# Patient Record
Sex: FEMALE | Race: OTHER | HISPANIC OR LATINO | Employment: OTHER | URBAN - METROPOLITAN AREA
[De-identification: names, ages, dates, MRNs, and addresses within clinical notes are randomized per-mention and may not be internally consistent; named-entity substitution may affect disease eponyms.]

---

## 2017-01-28 ENCOUNTER — GENERIC CONVERSION - ENCOUNTER (OUTPATIENT)
Dept: OTHER | Facility: OTHER | Age: 67
End: 2017-01-28

## 2017-02-16 ENCOUNTER — ALLSCRIPTS OFFICE VISIT (OUTPATIENT)
Dept: OTHER | Facility: OTHER | Age: 67
End: 2017-02-16

## 2017-04-16 DIAGNOSIS — R73.09 OTHER ABNORMAL GLUCOSE: ICD-10-CM

## 2017-04-16 DIAGNOSIS — E03.9 HYPOTHYROIDISM: ICD-10-CM

## 2017-06-06 ENCOUNTER — APPOINTMENT (OUTPATIENT)
Dept: LAB | Facility: HOSPITAL | Age: 67
End: 2017-06-06
Attending: FAMILY MEDICINE
Payer: MEDICARE

## 2017-06-06 ENCOUNTER — TRANSCRIBE ORDERS (OUTPATIENT)
Dept: LAB | Facility: HOSPITAL | Age: 67
End: 2017-06-06

## 2017-06-06 DIAGNOSIS — E78.49 FAMILIAL COMBINED HYPERLIPIDEMIA: Primary | ICD-10-CM

## 2017-06-06 DIAGNOSIS — E78.49 FAMILIAL COMBINED HYPERLIPIDEMIA: ICD-10-CM

## 2017-06-06 LAB
ALBUMIN SERPL BCP-MCNC: 3.5 G/DL (ref 3.5–5)
ALP SERPL-CCNC: 73 U/L (ref 46–116)
ALT SERPL W P-5'-P-CCNC: 31 U/L (ref 12–78)
ANION GAP SERPL CALCULATED.3IONS-SCNC: 6 MMOL/L (ref 4–13)
AST SERPL W P-5'-P-CCNC: 24 U/L (ref 5–45)
BILIRUB SERPL-MCNC: 0.36 MG/DL (ref 0.2–1)
BUN SERPL-MCNC: 17 MG/DL (ref 5–25)
CALCIUM SERPL-MCNC: 9 MG/DL (ref 8.3–10.1)
CHLORIDE SERPL-SCNC: 104 MMOL/L (ref 100–108)
CHOLEST SERPL-MCNC: 159 MG/DL (ref 50–200)
CO2 SERPL-SCNC: 31 MMOL/L (ref 21–32)
CREAT SERPL-MCNC: 0.7 MG/DL (ref 0.6–1.3)
ERYTHROCYTE [DISTWIDTH] IN BLOOD BY AUTOMATED COUNT: 13.9 % (ref 11.6–15.1)
GFR SERPL CREATININE-BSD FRML MDRD: >60 ML/MIN/1.73SQ M
GLUCOSE P FAST SERPL-MCNC: 102 MG/DL (ref 65–99)
HCT VFR BLD AUTO: 38.7 % (ref 34.8–46.1)
HDLC SERPL-MCNC: 59 MG/DL (ref 40–60)
HGB BLD-MCNC: 12.4 G/DL (ref 11.5–15.4)
LDLC SERPL CALC-MCNC: 84 MG/DL (ref 0–100)
MCH RBC QN AUTO: 29 PG (ref 26.8–34.3)
MCHC RBC AUTO-ENTMCNC: 32 G/DL (ref 31.4–37.4)
MCV RBC AUTO: 90 FL (ref 82–98)
PLATELET # BLD AUTO: 226 THOUSANDS/UL (ref 149–390)
PMV BLD AUTO: 10.8 FL (ref 8.9–12.7)
POTASSIUM SERPL-SCNC: 4.1 MMOL/L (ref 3.5–5.3)
PROT SERPL-MCNC: 7.7 G/DL (ref 6.4–8.2)
RBC # BLD AUTO: 4.28 MILLION/UL (ref 3.81–5.12)
SODIUM SERPL-SCNC: 141 MMOL/L (ref 136–145)
TRIGL SERPL-MCNC: 82 MG/DL
TSH SERPL DL<=0.05 MIU/L-ACNC: 1.39 UIU/ML (ref 0.36–3.74)
WBC # BLD AUTO: 5.56 THOUSAND/UL (ref 4.31–10.16)

## 2017-06-06 PROCEDURE — 36415 COLL VENOUS BLD VENIPUNCTURE: CPT

## 2017-06-06 PROCEDURE — 84443 ASSAY THYROID STIM HORMONE: CPT

## 2017-06-06 PROCEDURE — 80061 LIPID PANEL: CPT

## 2017-06-06 PROCEDURE — 85027 COMPLETE CBC AUTOMATED: CPT

## 2017-06-06 PROCEDURE — 80053 COMPREHEN METABOLIC PANEL: CPT

## 2017-07-18 ENCOUNTER — TRANSCRIBE ORDERS (OUTPATIENT)
Dept: SLEEP CENTER | Facility: CLINIC | Age: 67
End: 2017-07-18

## 2017-07-18 ENCOUNTER — HOSPITAL ENCOUNTER (OUTPATIENT)
Dept: SLEEP CENTER | Facility: CLINIC | Age: 67
Discharge: HOME | End: 2017-07-18
Payer: MEDICARE

## 2017-07-18 DIAGNOSIS — J44.9 OSA AND COPD OVERLAP SYNDROME (HCC): Primary | ICD-10-CM

## 2017-07-18 DIAGNOSIS — G47.33 OSA (OBSTRUCTIVE SLEEP APNEA): ICD-10-CM

## 2017-07-18 DIAGNOSIS — G47.33 OSA AND COPD OVERLAP SYNDROME (HCC): Primary | ICD-10-CM

## 2017-08-21 ENCOUNTER — ALLSCRIPTS OFFICE VISIT (OUTPATIENT)
Dept: OTHER | Facility: OTHER | Age: 67
End: 2017-08-21

## 2017-08-21 DIAGNOSIS — E66.9 OBESITY: ICD-10-CM

## 2017-08-21 DIAGNOSIS — R73.09 OTHER ABNORMAL GLUCOSE: ICD-10-CM

## 2017-08-22 ENCOUNTER — APPOINTMENT (OUTPATIENT)
Dept: LAB | Facility: HOSPITAL | Age: 67
End: 2017-08-22
Attending: INTERNAL MEDICINE
Payer: MEDICARE

## 2017-08-22 ENCOUNTER — TRANSCRIBE ORDERS (OUTPATIENT)
Dept: LAB | Facility: HOSPITAL | Age: 67
End: 2017-08-22

## 2017-08-22 DIAGNOSIS — E66.9 OBESITY: ICD-10-CM

## 2017-08-22 DIAGNOSIS — R73.09 OTHER ABNORMAL GLUCOSE: ICD-10-CM

## 2017-08-22 LAB
CORTIS AM PEAK SERPL-MCNC: 1 UG/DL (ref 4.2–22.4)
EST. AVERAGE GLUCOSE BLD GHB EST-MCNC: 126 MG/DL
HBA1C MFR BLD: 6 % (ref 4.2–6.3)

## 2017-08-22 PROCEDURE — 36415 COLL VENOUS BLD VENIPUNCTURE: CPT

## 2017-08-22 PROCEDURE — 83036 HEMOGLOBIN GLYCOSYLATED A1C: CPT

## 2017-08-22 PROCEDURE — 82533 TOTAL CORTISOL: CPT

## 2017-08-23 ENCOUNTER — GENERIC CONVERSION - ENCOUNTER (OUTPATIENT)
Dept: OTHER | Facility: OTHER | Age: 67
End: 2017-08-23

## 2017-10-17 ENCOUNTER — TRANSCRIBE ORDERS (OUTPATIENT)
Dept: LAB | Facility: HOSPITAL | Age: 67
End: 2017-10-17

## 2017-10-17 ENCOUNTER — APPOINTMENT (OUTPATIENT)
Dept: LAB | Facility: HOSPITAL | Age: 67
End: 2017-10-17
Attending: FAMILY MEDICINE
Payer: MEDICARE

## 2017-10-17 DIAGNOSIS — E78.49 FAMILIAL COMBINED HYPERLIPIDEMIA: Primary | ICD-10-CM

## 2017-10-17 DIAGNOSIS — E78.49 FAMILIAL COMBINED HYPERLIPIDEMIA: ICD-10-CM

## 2017-10-17 LAB
ALBUMIN SERPL BCP-MCNC: 4 G/DL (ref 3.5–5)
ALP SERPL-CCNC: 67 U/L (ref 46–116)
ALT SERPL W P-5'-P-CCNC: 36 U/L (ref 12–78)
ANION GAP SERPL CALCULATED.3IONS-SCNC: 5 MMOL/L (ref 4–13)
AST SERPL W P-5'-P-CCNC: 29 U/L (ref 5–45)
BASOPHILS # BLD AUTO: 0.01 THOUSANDS/ΜL (ref 0–0.1)
BASOPHILS NFR BLD AUTO: 0 % (ref 0–1)
BILIRUB SERPL-MCNC: 0.51 MG/DL (ref 0.2–1)
BUN SERPL-MCNC: 15 MG/DL (ref 5–25)
CALCIUM SERPL-MCNC: 9.1 MG/DL (ref 8.3–10.1)
CHLORIDE SERPL-SCNC: 105 MMOL/L (ref 100–108)
CHOLEST SERPL-MCNC: 152 MG/DL (ref 50–200)
CO2 SERPL-SCNC: 32 MMOL/L (ref 21–32)
CREAT SERPL-MCNC: 0.6 MG/DL (ref 0.6–1.3)
EOSINOPHIL # BLD AUTO: 0.03 THOUSAND/ΜL (ref 0–0.61)
EOSINOPHIL NFR BLD AUTO: 1 % (ref 0–6)
ERYTHROCYTE [DISTWIDTH] IN BLOOD BY AUTOMATED COUNT: 13.6 % (ref 11.6–15.1)
GFR SERPL CREATININE-BSD FRML MDRD: 96 ML/MIN/1.73SQ M
GLUCOSE P FAST SERPL-MCNC: 97 MG/DL (ref 65–99)
HCT VFR BLD AUTO: 39.8 % (ref 34.8–46.1)
HDLC SERPL-MCNC: 52 MG/DL (ref 40–60)
HGB BLD-MCNC: 12.8 G/DL (ref 11.5–15.4)
LDLC SERPL CALC-MCNC: 79 MG/DL (ref 0–100)
LYMPHOCYTES # BLD AUTO: 1.65 THOUSANDS/ΜL (ref 0.6–4.47)
LYMPHOCYTES NFR BLD AUTO: 29 % (ref 14–44)
MCH RBC QN AUTO: 28.9 PG (ref 26.8–34.3)
MCHC RBC AUTO-ENTMCNC: 32.2 G/DL (ref 31.4–37.4)
MCV RBC AUTO: 90 FL (ref 82–98)
MONOCYTES # BLD AUTO: 0.83 THOUSAND/ΜL (ref 0.17–1.22)
MONOCYTES NFR BLD AUTO: 15 % (ref 4–12)
NEUTROPHILS # BLD AUTO: 3.22 THOUSANDS/ΜL (ref 1.85–7.62)
NEUTS SEG NFR BLD AUTO: 55 % (ref 43–75)
NRBC BLD AUTO-RTO: 0 /100 WBCS
PLATELET # BLD AUTO: 221 THOUSANDS/UL (ref 149–390)
PMV BLD AUTO: 10.7 FL (ref 8.9–12.7)
POTASSIUM SERPL-SCNC: 3.8 MMOL/L (ref 3.5–5.3)
PROT SERPL-MCNC: 8 G/DL (ref 6.4–8.2)
RBC # BLD AUTO: 4.43 MILLION/UL (ref 3.81–5.12)
SODIUM SERPL-SCNC: 142 MMOL/L (ref 136–145)
TRIGL SERPL-MCNC: 104 MG/DL
TSH SERPL DL<=0.05 MIU/L-ACNC: 1.27 UIU/ML (ref 0.36–3.74)
WBC # BLD AUTO: 5.74 THOUSAND/UL (ref 4.31–10.16)

## 2017-10-17 PROCEDURE — 80053 COMPREHEN METABOLIC PANEL: CPT

## 2017-10-17 PROCEDURE — 80061 LIPID PANEL: CPT

## 2017-10-17 PROCEDURE — 84443 ASSAY THYROID STIM HORMONE: CPT

## 2017-10-17 PROCEDURE — 85025 COMPLETE CBC W/AUTO DIFF WBC: CPT

## 2017-10-17 PROCEDURE — 36415 COLL VENOUS BLD VENIPUNCTURE: CPT

## 2018-01-11 NOTE — RESULT NOTES
Message   Normal     Verified Results  (1) T4, FREE [id number] 03:58PM Jessica, Bankim     Test Name Result Flag Reference   T4,FREE 1 22 ng/dL  0 76-1 46     (1) TSH [id number] 03:58PM Jessica, Bankim     Test Name Result Flag Reference   TSH 0 716 uIU/mL  0 358-3 740   Patients undergoing fluorescein dye angiography may retain small amounts of fluorescein in the body for 48-72 hours post procedure  Samples containing fluorescein can produce falsely depressed TSH values  If the patient had this procedure,a specimen should be resubmitted post fluorescein clearance          The recommended reference ranges for TSH during pregnancy are as follows:  First trimester 0 1 to 2 5 uIU/mL  Second trimester  0 2 to 3 0 uIU/mL  Third trimester 0 3 to 3 0 uIU/m

## 2018-01-12 NOTE — PROGRESS NOTES
Assessment    1  Hypothyroidism (244 9) (E03 9)   2  Hyperlipidemia (272 4) (E78 5)   3  Hypertension (401 9) (I10)   4  Obesity (278 00) (E66 9)   5  Vitamin D deficiency (268 9) (E55 9)   6  Abnormal glucose (790 29) (R73 09)    Plan  Abnormal glucose    · (1) HEMOGLOBIN A1C; Status:Active; Requested for:99Fah5260;    Perform:[facility]; MACI:57WAO1178; Ordered; For:Abnormal glucose; Ordered By:Radha Lopez;  Obesity    · Dexamethasone 1 MG Oral Tablet; Take one tablet at 11 pm   Rx By: Louann Lui; Dispense: 1 Days ; #:1 Tablet; Refill: 0; For: Obesity; SUPRIYA = N; Verified Transmission to [facility] #[id number] Last Updated By: System, [employer]Script[employer]; 8/21/2017 10:55:29 AM   · (1) CORTISOL AM SPECIMEN; Status:Active; Requested for:52Tfq9797;    Perform:[facility]; OBA:[id number]; Ordered;  For:Obesity; Ordered By:Radha Lopez;   · Follow-up visit in 6 months Evaluation and Treatment  Follow-up  Status: Hold For -  Scheduling  Requested for: 86Ifc0666   Ordered; For: Obesity; Ordered By: Louann Lui Performed:  Due: 60LTV9395   · Follow-Up With Advanced Practitioner Evaluation and Treatment  Follow-up  Status: Hold  For - Scheduling  Requested for: 38Ohx3282   Ordered; For: Obesity; Ordered By: Louann Lui Performed:  Due: 22Kow4572  Unlinked    · Pristiq 50 MG Oral Tablet Extended Release 24 Hour (Desvenlafaxine Succinate  ER)   Dispense: 30 Days ; #:30 TB24; Refill: 0; SUPRIYA = N; Record; Last Updated By: Tiburcio Livingston; 8/21/2017 10:34:42 AM    Discussion/Summary  Discussion Summary:   1  Hypothyroidism-continue current thyroid hormone replacement  2  Abnormal glucose tolerance-check hemoglobin A1c     3  Abnormal weight gain over time-is most likely due to diet  Rule out Cushing's disease by doing a 1 mg dexamethasone suppression test     4  Hypertension-this is close to goal     5  Hyperlipidemia-continue statin     Counseling Documentation With Imm: The patient was counseled regarding diagnostic results, instructions for management, patient and family education, impressions  Medication SE Review and Pt Understands Tx: The treatment plan was reviewed with the patient/guardian  The patient/guardian understands and agrees with the treatment plan      Chief Complaint  Chief Complaint Free Text Note Form: Follow up      History of Present Illness  Obesity (Follow-Up): The patient is being seen for follow-up of obesity  The patient reports doing poorly and a weight gain of 55 in 2 years pounds  She has had no significant interval events  The patient is currently asymptomatic  Associated symptoms: no poor self esteem and no constipation  Hypothyroidism (Follow-Up): The patient is being seen for follow-up of hypothyroidism of undetermined etiology  The patient reports doing well  She has had no significant interval events  The patient is currently asymptomatic  Associated symptoms: no myalgias, no arthralgias and no paresthesias  Medications include levothyroxine  Medications:  the patient is adherent to her medication regimen, but she denies medication side effects  Hyperlipidemia (Follow-Up): The patient states her hyperlipidemia has been under good control since the last visit  Comorbid Illnesses: diabetes mellitus and hypertension  She has no significant interval events  Symptoms: The patient is currently asymptomatic  Hypertension (Follow-Up): The patient presents for follow-up of essential hypertension  The patient states she has been doing well with her blood pressure control since the last visit  She has no comorbid illnesses  She has no significant interval events  Symptoms: The patient is currently asymptomatic  Associated symptoms include no headache  Review of Systems  ROS Reviewed:   ROS reviewed         Endo Adult ROS Female Established v2 Update - St ke:   Constitutional/General: recent weight gain, no recent weight loss, poor energy/fatigue, no increased energy level, no insomnia/sleep problems, no fever and no feeling weak  Breasts: no nipple discharge  Heart: high blood pressure, but no chest pain/tightness, no rapid/racing heart rate and no palpitations  Genitourinary - Urinary: no frequent urination, no excess urination and no urinating during the night  Eyes: no blurred vision, no double vision, no bulging eyes, no gritty/scratchy eyes and no excessive tearing  Mouth / Throat: difficulty swallowing, but no hoarseness  Neck: no lumps, no swollen glands, no neck pain, neck stiffness and no enlarged thyroid  Respiratory: no wheezing, no asthma and no persistent cough  Musculoskeletal: muscle aches/pain, joint aches/pain and muscle weakness  Skin & Hair: dry skin, no acne, the hair texture was not oily, no hair loss and no excessive hair growth  Gastrointestinal: no constipation, no diarrhea, no waking at night to drink and no stomach ache  Neurological: no blackouts, no weakness and no tremors  Reproductive:  frequency of period is not applicable  duration of period is not applicable  Date of last menstruation is not applicable  regular periods are not applicable  discomfort with periods is not applicable  excessive bleeding during period is not applicable  mood swings are not applicable  Endocrine: feeling hot frequently, no feeling cold frequently, no shifts between feeling hot and cold, cold hands or feet, no excessive sweating, thyroid problems, no blood sugar problems, no excessive thirst, excessive hunger, no change in shoe size, no nausea or vomiting and shaky hands  Active Problems    1  Abnormal glucose (790 29) (R73 09)   2  Back pain (724 5) (M54 9)   3  Benign colon polyp (211 3) (K63 5)   4  Blood loss anemia (280 0) (D50 0)   5  Carpal tunnel syndrome, unspecified laterality (354 0) (G56 00)   6  Cervical disc disorder with myelopathy (722 71) (M50 10)   7  Cervical radiculopathy (723 4) (M54 12)   8   Chronic myofascial pain (033 8,116 41) (M79 1,G89 29)   9  Gait disturbance (781 2) (R26 9)   10  Hyperlipidemia (272 4) (E78 5)   11  Hypertension (401 9) (I10)   12  Hypothyroidism (244 9) (E03 9)   13  Lumbar radiculopathy (724 4) (M54 16)   14  Lumbar spine pain (724 2) (M54 5)   15  Lumbar stenosis (724 02) (M48 06)   16  Neck pain (723 1) (M54 2)   17  Obesity (278 00) (E66 9)   18  Reactive hypoglycemia (251 2) (E16 1)   19  S/P lumbar fusion (V45 4) (Z98 1)   20  Sleep apnea (780 57) (G47 30)   21  Spondylolisthesis of lumbar region (738 4) (M43 16)   22  Status post cervical arthrodesis (V45 4) (Z98 1)   23  Vitamin D deficiency (268 9) (E55 9)    Past Medical History    1  History of Benign essential hypertension (401 1) (I10)   2  History of hypothyroidism (V12 29) (Z86 39)  Active Problems And Past Medical History Reviewed: The active problems and past medical history were reviewed and updated today  Surgical History    1  History of Knee Arthroplasty   2  History of Laminectomy Lumbar  Surgical History Reviewed: The surgical history was reviewed and updated today  Family History  Mother    1  Family history of Diabetes Mellitus (V18 0)  Sister    2  Family history of Cancer   3  Family history of Colon Cancer (V16 0)   4  Family history of Heart Disease (V17 49)   5  Family history of Inflammatory Polyps Of Colon   6  Family history of Thyroid disease  Brother    7  Family history of Cancer   8  Family history of Colon Cancer (V16 0)   9  Family history of Heart Disease (V17 49)   10  Family history of Thyroid disease  Family History    11  Family history of Colon Cancer (V16 0)   12  Family history of Generalized Osteoarthritis   13  Family history of Thyroid Disorder (V18 19)  Family History Reviewed: The family history was reviewed and updated today  Social History    · Denied: History of Drug Use   · Former smoker (V15 82) (J47 055)   · Never Drank Alcohol  Social History Reviewed:  The social history was reviewed and updated today  Current Meds   1  Atorvastatin Calcium 10 MG Oral Tablet; TAKE 1 TABLET AT BEDTIME; Therapy: 82Rpv3275 to (Evaluate:26Jan2018)  Requested for: 98FBY1121; Last   Rx:31Jan2017 Ordered   2  [facility] Vitamin B-12 1000 MCG Oral Tablet; TAKE 1 TABLET DAILY AS DIRECTED; Therapy: 02ZLQ5991 to (Evaluate:07Feb2016)  Requested for: 63KOW3926; Last   Rx:09Nov2015 Ordered   3  [facility] Vitamin B-6 100 MG TABS; TAKE 2 TABLETS (200MG) ONCE A DAY; Therapy: 17IMH8535 to (Evaluate:07Feb2016)  Requested for: 86AZK2045; Last   Rx:09Nov2015 Ordered   4  Furosemide 40 MG Oral Tablet; Therapy: 30Apr2011 to (Last Rx:30Apr2011)  Requested for: 30Apr2011 Ordered   5  Lyrica 75 MG Oral Capsule; TAKE 1 CAPSULE 3 TIMES DAILY; Therapy: (Recorded:20Nov2015) to Recorded   6  Multi-Vitamin Daily TABS; Therapy: (Recorded:20Nov2013) to Recorded   7  Pristiq 100 MG Oral Tablet Extended Release 24 Hour Recorded   8  Pristiq 50 MG Oral Tablet Extended Release 24 Hour; Therapy: 74IPJ0837 to (Evaluate:13May2015) Recorded   9  Synthroid 100 MCG Oral Tablet; take 1 tablet every day; Therapy: 37WSA8425 to ([facility])  Requested for: 87NUQ9736; Last   Rx:27Feb2017 Ordered   10  Vitamin D3 2000 UNIT Oral Capsule; Therapy: (Recorded:20Nov2013) to Recorded  Medication List Reviewed: The medication list was reviewed and updated today  Allergies    1  No Known Drug Allergies    Vitals  Vital Signs    Recorded: 21Aug2017 10:32AM   Heart Rate 64   Systolic 729   Diastolic 86   Height 4 ft 10 in   Weight 225 lb    BMI Calculated 47 02   BSA Calculated 1 91     Physical Exam    Constitutional   General appearance: No acute distress, well appearing and well nourished  Eyes   Conjunctiva and lids: No swelling, erythema, or discharge  Pupils: Equal, round and reactive to light  The sclera are anicteric  Extraocular movements are intact      Ears, Nose, Mouth, and Throat   External inspection of ears, nose and lips: Normal     Oropharynx: Normal with no erythema, edema, exudate or lesions  Exam of Head: The head is atraumatic and normocephalic  Neck: The neck is supple  The thyroid is normal in size with no palpable nodules  Pulmonary   Auscultation of lungs: Clear to auscultation bilaterally with normal chest expansion  Cardiovascular   Auscultation of heart: Normal rate and rhythm with no murmurs, gallops or rubs  Abdomen   Abdomen: Abdomen is soft, non-tender with normal bowel sounds  Lymphatic   Palpation of lymph nodes: No supraclavicular or suboccipital lymphadenopathy  Musculoskeletal   Inspection/palpation of joints, bones, and muscles: Muscle bulk and tone is normal     Skin   Skin and subcutaneous tissue: Normal skin temperature and color  Neurologic   Reflexes: 2+ and symmetric  Motor Strength: Strength is 5/5 bilaterally  Psychiatric   Orientation to person, place and time: Normal     Mood and affect: Affect and attention span are normal        Health Management  Benign colon polyp   COLONOSCOPY; every 3 years; Last 65TAH3853; Next Due: 62FXN6985;  Overdue    Signatures   Electronically signed by : JONATHAN Teixeira ; Aug 21 2017 11:01AM EST                       (Author)

## 2018-01-12 NOTE — RESULT NOTES
Discussion/Summary   Diabetes is under good control  There is no evidence of Cushing's  Verified Results  (1) HEMOGLOBIN A1C 22Aug2017 07:36AM Yolie Abed Order Number: [id number]     Test Name Result Flag Reference   HEMOGLOBIN A1C 6 0 %  4 2-6 3   EST  AVG  GLUCOSE 126 mg/dl       (1) CORTISOL AM SPECIMEN 51Zct9420 07:36AM Yolie Abed Order Number: [id number]     Test Name Result Flag Reference   CORTISO AM SPEC 1 0 ug/dL LL 4 2-22 4   Reference ranges established for specimens drawn between 7 and 9 am  Results may be inaccurate if timing is not correct

## 2018-01-13 VITALS
HEART RATE: 64 BPM | DIASTOLIC BLOOD PRESSURE: 86 MMHG | BODY MASS INDEX: 47.23 KG/M2 | HEIGHT: 58 IN | SYSTOLIC BLOOD PRESSURE: 136 MMHG | WEIGHT: 225 LBS

## 2018-01-13 NOTE — RESULT NOTES
Message   All of the labs are normal      Verified Results  (1) HEMOGLOBIN A1C 54ETJ7393 08:18AM [place]     Test Name Result Flag Reference   HEMOGLOBIN A1C 5 7 %  4 2-6 3   EST  AVG  GLUCOSE 117 mg/dl       (1) T4, FREE 79FZH5594 08:18AM [place]     Test Name Result Flag Reference   T4,FREE 1 03 ng/dL  0 76-1 46     (1) COMPREHENSIVE METABOLIC PANEL 62RVL4259 48:05AI [place]     Test Name Result Flag Reference   GLUCOSE,RANDM 94 mg/dL     If the patient is fasting, the ADA then defines impaired fasting glucose as > 100 mg/dL and diabetes as > or equal to 123 mg/dL  SODIUM 143 mmol/L  136-145   POTASSIUM 4 3 mmol/L  3 5-5 3   CHLORIDE 104 mmol/L  100-108   CARBON DIOXIDE 33 mmol/L H 21-32   ANION GAP (CALC) 6 mmol/L  4-13   BLOOD UREA NITROGEN 20 mg/dL  5-25   CREATININE 0 66 mg/dL  0 60-1 30   Standardized to IDMS reference method   CALCIUM 9 3 mg/dL  8 3-10 1   BILI, TOTAL 0 33 mg/dL  0 20-1 00   ALK PHOSPHATAS 87 U/L     ALT (SGPT) 27 U/L  12-78   AST(SGOT) 20 U/L  5-45   ALBUMIN 3 7 g/dL  3 5-5 0   TOTAL PROTEIN 7 8 g/dL  6 4-8 2   eGFR Non-African American      >60 0 ml/min/1 73sq [facility] [employer] Disease Education Program recommendations are as follows:  GFR calculation is accurate only with a steady state creatinine  Chronic Kidney disease less than 60 ml/min/1 73 sq  meters  Kidney failure less than 15 ml/min/1 73 sq  meters  (1) LIPID PANEL, FASTING 03EGX0930 08:18AM [place]     Test Name Result Flag Reference   CHOLESTEROL 169 mg/dL     HDL,DIRECT 67 mg/dL H 40-60   Specimen collection should occur prior to Metamizole administration due to the potential for falsely depressed results     LDL CHOLESTEROL CALCULATED 87 mg/dL  0-100   Triglyceride:         Normal              <150 mg/dl       Borderline High    150-199 mg/dl       High               200-499 mg/dl       Very High          >499 mg/dl  Cholesterol:         Desirable <200 mg/dl      Borderline High  200-239 mg/dl      High             >239 mg/dl  HDL Cholesterol:        High    >59 mg/dL      Low     <41 mg/dL  LDL CALCULATED:    This screening LDL is a calculated result  It does not have the accuracy of the Direct Measured LDL in the monitoring of patients with hyperlipidemia and/or statin therapy  Direct Measure LDL (KGU833) must be ordered separately in these patients  TRIGLYCERIDES 77 mg/dL  <=150   Specimen collection should occur prior to N-Acetylcysteine or Metamizole administration due to the potential for falsely depressed results  (1) [facility] 06AVR5948 [facility]      Test Name Result Flag Reference   TSH 0 931 uIU/mL  0 358-3 740   Patients undergoing fluorescein dye angiography may retain small amounts of fluorescein in the body for 48-72 hours post procedure  Samples containing fluorescein can produce falsely depressed TSH values  If the patient had this procedure,a specimen should be resubmitted post fluorescein clearance            The recommended reference ranges for TSH during pregnancy are as follows:  First trimester 0 1 to 2 5 uIU/mL  Second trimester  0 2 to 3 0 uIU/mL  Third trimester 0 3 to 3 0 uIU/m

## 2018-01-14 VITALS
WEIGHT: 218.01 LBS | HEIGHT: 58 IN | HEART RATE: 78 BPM | BODY MASS INDEX: 45.76 KG/M2 | SYSTOLIC BLOOD PRESSURE: 138 MMHG | DIASTOLIC BLOOD PRESSURE: 88 MMHG

## 2018-01-18 NOTE — MISCELLANEOUS
Message  GI Reminder Recall [facility]:   Date: 01/28/2017   Dear Jonas Banegas:     Review of our records shows you are due for the following: Colonoscopy  Please call the following office to schedule your appointment:   [address] [facility], [facility] [address], [place], [address] [place] [address]  [phone]  We look forward to hearing from you!      Sincerely,     Dr Sarah Lance MD      Signatures   Electronically signed by : Barbra Day, ; Jan 28 2017  8:57AM EST                       (Author)

## 2018-02-22 RX ORDER — LEVOTHYROXINE SODIUM 0.1 MG/1
1 TABLET ORAL DAILY
COMMUNITY
Start: 2014-11-17 | End: 2018-03-11 | Stop reason: SDUPTHER

## 2018-02-22 RX ORDER — ACETAMINOPHEN 160 MG
1 TABLET,DISINTEGRATING ORAL DAILY
COMMUNITY

## 2018-02-22 RX ORDER — PREGABALIN 75 MG/1
1 CAPSULE ORAL 3 TIMES DAILY
COMMUNITY

## 2018-02-22 RX ORDER — FUROSEMIDE 40 MG/1
1 TABLET ORAL DAILY
COMMUNITY
Start: 2011-04-30

## 2018-02-22 RX ORDER — DESVENLAFAXINE 100 MG/1
1 TABLET, EXTENDED RELEASE ORAL DAILY
COMMUNITY

## 2018-02-22 RX ORDER — PYRIDOXINE HCL (VITAMIN B6) 100 MG
1 TABLET ORAL DAILY
COMMUNITY
Start: 2014-10-21

## 2018-02-22 RX ORDER — MULTIVITAMIN
1 TABLET ORAL DAILY
COMMUNITY
End: 2018-12-04 | Stop reason: ALTCHOICE

## 2018-02-22 RX ORDER — ATORVASTATIN CALCIUM 10 MG/1
1 TABLET, FILM COATED ORAL DAILY
COMMUNITY
Start: 2012-08-21

## 2018-02-26 ENCOUNTER — OFFICE VISIT (OUTPATIENT)
Dept: ENDOCRINOLOGY | Facility: CLINIC | Age: 68
End: 2018-02-26
Payer: MEDICARE

## 2018-02-26 VITALS
HEART RATE: 74 BPM | BODY MASS INDEX: 51.51 KG/M2 | SYSTOLIC BLOOD PRESSURE: 142 MMHG | DIASTOLIC BLOOD PRESSURE: 82 MMHG | HEIGHT: 56 IN | WEIGHT: 229 LBS

## 2018-02-26 DIAGNOSIS — E03.9 HYPOTHYROIDISM, UNSPECIFIED TYPE: Primary | ICD-10-CM

## 2018-02-26 DIAGNOSIS — E16.1 REACTIVE HYPOGLYCEMIA: ICD-10-CM

## 2018-02-26 DIAGNOSIS — E78.5 HYPERLIPIDEMIA, UNSPECIFIED HYPERLIPIDEMIA TYPE: ICD-10-CM

## 2018-02-26 DIAGNOSIS — R73.09 ABNORMAL GLUCOSE: ICD-10-CM

## 2018-02-26 DIAGNOSIS — I10 HYPERTENSION, UNSPECIFIED TYPE: ICD-10-CM

## 2018-02-26 DIAGNOSIS — IMO0001 CLASS 3 OBESITY DUE TO EXCESS CALORIES WITH SERIOUS COMORBIDITY AND BODY MASS INDEX (BMI) OF 50.0 TO 59.9 IN ADULT: ICD-10-CM

## 2018-02-26 DIAGNOSIS — E55.9 VITAMIN D DEFICIENCY: ICD-10-CM

## 2018-02-26 PROCEDURE — 99214 OFFICE O/P EST MOD 30 MIN: CPT | Performed by: PHYSICIAN ASSISTANT

## 2018-02-26 NOTE — PROGRESS NOTES
Established Patient Progress Note       Chief Complaint   Patient presents with    Hyperglycemia    Vitamin D Deficiency    Hypothyroidism    Hyperlipidemia        History of Present Illness:     Lionel Schmitz is a 79 y o  female with a history of Impaired glucose tolerance, Hypothyroidism, vitamin D deficiency, Hyperlipidemia  the Hypothyroidism, She takes Synthroid 100mcg daily  Overall she feels well  She is upset that she has  had weight gain  She is not active due to chronic pain  She says she sits at home all day and eats  the Vitamin D Deficiency, she is taking 2000 units daily  For the Impaired glucose tolerance, she has had difficulty losing weight  She is upset that she has  had weight gain  She is not active due to chronic pain  She says she sits at home all day and eats  MNT not covered by insurance  For the hyperlipidemia, she takes atorvastatin and denies myalgias  She has history of HTN but BP has been good lately  Patient Active Problem List   Diagnosis    Abnormal glucose    Back pain    Benign colon polyp    Blood loss anemia    Carpal tunnel syndrome    Cervical disc disorder with myelopathy    Cervical radiculopathy    Chronic myofascial pain    Gait disturbance    Hyperlipidemia    Hypertension    Hypothyroidism    Lumbar radiculopathy    Lumbar spine pain    Lumbar stenosis    Neck pain    Obesity    Reactive hypoglycemia    Sleep apnea    Spondylolisthesis of lumbar region    Vitamin D deficiency      No past medical history on file     Past Surgical History:   Procedure Laterality Date    NO PAST SURGERIES        Family History   Problem Relation Age of Onset    Diabetes unspecified Mother     Hypertension Mother     Hypertension Sister     Thyroid disease unspecified Sister     Thyroid disease unspecified Brother     Cancer Brother     Cancer Maternal Uncle      Social History   Substance Use Topics    Smoking status: Former Smoker    Smokeless tobacco: Never Used    Alcohol use No     No Known Allergies    Current Outpatient Prescriptions:     atorvastatin (LIPITOR) 10 mg tablet, Take 1 tablet by mouth daily, Disp: , Rfl:     Cholecalciferol (VITAMIN D3) 2000 units capsule, Take 1 capsule by mouth daily, Disp: , Rfl:     cyanocobalamin ([employer]S VITAMIN B-12) 1000 MCG tablet, Take 1 tablet by mouth daily, Disp: , Rfl:     desvenlafaxine (PRISTIQ) 100 mg 24 hr tablet, Take 1 tablet by mouth daily, Disp: , Rfl:     furosemide (LASIX) 40 mg tablet, Take 1 tablet by mouth daily, Disp: , Rfl:     levothyroxine (SYNTHROID) 100 mcg tablet, Take 1 tablet by mouth daily, Disp: , Rfl:     Multiple Vitamin (MULTI-VITAMIN DAILY) TABS, Take 1 tablet by mouth daily, Disp: , Rfl:     pregabalin (LYRICA) 75 mg capsule, Take 1 capsule by mouth 3 (three) times a day, Disp: , Rfl:     pyridoxine (CVS VITAMIN B-6) 100 MG tablet, Take 1 tablet by mouth daily, Disp: , Rfl:     Review of Systems   Constitutional: Positive for unexpected weight change  Negative for activity change, appetite change, chills, diaphoresis, fatigue and fever  HENT: Negative for trouble swallowing and voice change  Eyes: Negative for visual disturbance  Respiratory: Negative for shortness of breath  Cardiovascular: Negative for chest pain and palpitations  Gastrointestinal: Negative for abdominal pain, constipation and diarrhea  Endocrine: Negative for cold intolerance, heat intolerance, polydipsia, polyphagia and polyuria  Genitourinary: Negative for frequency and menstrual problem  Musculoskeletal: Positive for arthralgias, back pain, myalgias and neck pain  Skin: Negative for rash  Allergic/Immunologic: Negative for food allergies  Neurological: Negative for dizziness and tremors  Hematological: Negative for adenopathy  Psychiatric/Behavioral: Negative for sleep disturbance  All other systems reviewed and are negative        Physical Exam:  Body mass index is 51 34 kg/m²  /82   Pulse 74   Ht 4' 8" (1 422 m)   Wt 104 kg (229 lb)   BMI 51 34 kg/m²    Wt Readings from Last 3 Encounters:   02/26/18 104 kg (229 lb)   08/21/17 102 kg (225 lb)   02/16/17 98 9 kg (218 lb 0 1 oz)       Physical Exam   Constitutional: She is oriented to person, place, and time  She appears well-developed and well-nourished  No distress  Obese   HENT:   Head: Normocephalic and atraumatic  Eyes: Conjunctivae are normal  Pupils are equal, round, and reactive to light  Neck: Normal range of motion  Neck supple  No thyromegaly present  Cardiovascular: Normal rate, regular rhythm and normal heart sounds  Pulmonary/Chest: Effort normal and breath sounds normal  No respiratory distress  She has no wheezes  She has no rales  Abdominal: Soft  Bowel sounds are normal  She exhibits no distension  There is no tenderness  Musculoskeletal: Normal range of motion  She exhibits no edema  Slow gait with cane   Neurological: She is alert and oriented to person, place, and time  Skin: Skin is warm and dry  Psychiatric: She has a normal mood and affect  Vitals reviewed  Labs:       Lab Results   Component Value Date    CREATININE 0 60 10/17/2017    CREATININE 0 70 06/06/2017    CREATININE 0 66 11/11/2016    BUN 15 10/17/2017     10/17/2017    K 3 8 10/17/2017     10/17/2017    CO2 32 10/17/2017     eGFR   Date Value Ref Range Status   10/17/2017 96 ml/min/1 73sq m Final       Lab Results   Component Value Date    CHOL 152 10/17/2017    HDL 52 10/17/2017    TRIG 104 10/17/2017       Lab Results   Component Value Date    ALT 36 10/17/2017    AST 29 10/17/2017    ALKPHOS 67 10/17/2017    BILITOT 0 51 10/17/2017       Lab Results   Component Value Date    FREET4 1 03 11/11/2016         Impression & Plan:    Problem List Items Addressed This Visit     Abnormal glucose     A1C 6 0 in pre-diabetes range with normal fasting glucose  Refer to prediabetes class  Discussed importance of lifestyle modification and weight loss  Relevant Orders    Hemoglobin A1c    Hyperlipidemia     LDL at goal on statin  Relevant Medications    atorvastatin (LIPITOR) 10 mg tablet    Other Relevant Orders    Comprehensive metabolic panel    Hypertension     BP slightly high today, usually under good control at past few visits and visit to PCP  She is going to work on weight loss  Relevant Medications    furosemide (LASIX) 40 mg tablet    Hypothyroidism - Primary     Continue Synthroid 100mcg daily  Relevant Medications    levothyroxine (SYNTHROID) 100 mcg tablet    Other Relevant Orders    TSH, 3rd generation    T4, free    Obesity     Discussed prediabetes class and she will look into scheduling  Not interested in MNT due to lack of insurance coverage or HNT weight loss program   She would like to work on weight loss on her own  She is asking if bariatric surgery is an option for her  She will try harder to lose weight on her own, as she has been successful in the past, but let us know if she would like a referral to get more information on surgical weight loss  Reactive hypoglycemia     Encouraged her to meet with dietician but insurance wont cover  Refer to pre-diabetes class and she can get some nutrition guidance at this class  Vitamin D deficiency     Continue supplements  Orders Placed This Encounter   Procedures    Hemoglobin A1c     Standing Status:   Future     Standing Expiration Date:   2/26/2019    Comprehensive metabolic panel     This is a patient instruction: Patient fasting for 8 hours or longer recommended  Standing Status:   Future     Standing Expiration Date:   2/26/2019    TSH, 3rd generation     This is a patient instruction: This test is non-fasting  Please drink two glasses of water morning of bloodwork          Standing Status:   Future     Standing Expiration Date:   2/26/2019    T4, free     Standing Status:   Future     Standing Expiration Date:   2/26/2019       Patient Instructions   Complete fasting lab test in 3 months    Work on diet/exercise/weight loss    Set up pre-diabetes class  Discussed with the patient and all questioned fully answered  She will call me if any problems arise  Follow-up appointment in 6 months       Counseled patient on diagnostic results, prognosis, risk and benefit of treatment options, instruction for management, importance of treatment compliance, Risk  factor reduction and impressions      Bryan Uribe PA-C

## 2018-02-26 NOTE — ASSESSMENT & PLAN NOTE
A1C 6 0 in pre-diabetes range with normal fasting glucose  Refer to prediabetes class  Discussed importance of lifestyle modification and weight loss

## 2018-02-26 NOTE — LETTER
February 26, 2018     Padmini Brooks, [address] [facility] [address]    Patient: Leighton Barkley   YOB: 1950   Date of Visit: 2/26/2018       Dear Dr Jovan Kenney: Thank you for referring Samantha Bence to me for evaluation  Below are my notes for this consultation  If you have questions, please do not hesitate to call me  I look forward to following your patient along with you  Sincerely,        Kala Singh PA-C        CC: No Recipients  Kala Singh PA-C  2/26/2018 11:16 AM  Sign at close encounter    Established Patient Progress Note       Chief Complaint   Patient presents with    Hyperglycemia    Vitamin D Deficiency    Hypothyroidism    Hyperlipidemia        History of Present Illness:     Leighton Barkley is a 79 y o  female with a history of Impaired glucose tolerance, Hypothyroidism, vitamin D deficiency, Hyperlipidemia  the Hypothyroidism, She takes Synthroid 100mcg daily  Overall she feels well  She is upset that she has  had weight gain  She is not active due to chronic pain  She says she sits at home all day and eats  the Vitamin D Deficiency, she is taking 2000 units daily  For the Impaired glucose tolerance, she has had difficulty losing weight  She is upset that she has  had weight gain  She is not active due to chronic pain  She says she sits at home all day and eats  MNT not covered by insurance  For the hyperlipidemia, she takes atorvastatin and denies myalgias  She has history of HTN but BP has been good lately         Patient Active Problem List   Diagnosis    Abnormal glucose    Back pain    Benign colon polyp    Blood loss anemia    Carpal tunnel syndrome    Cervical disc disorder with myelopathy    Cervical radiculopathy    Chronic myofascial pain    Gait disturbance    Hyperlipidemia    Hypertension    Hypothyroidism    Lumbar radiculopathy    Lumbar spine pain    Lumbar stenosis    Neck pain    Obesity    Reactive hypoglycemia    Sleep apnea    Spondylolisthesis of lumbar region    Vitamin D deficiency      No past medical history on file  Past Surgical History:   Procedure Laterality Date    NO PAST SURGERIES        Family History   Problem Relation Age of Onset    Diabetes unspecified Mother     Hypertension Mother     Hypertension Sister     Thyroid disease unspecified Sister     Thyroid disease unspecified Brother     Cancer Brother     Cancer Maternal Uncle      Social History   Substance Use Topics    Smoking status: Former Smoker    Smokeless tobacco: Never Used    Alcohol use No     No Known Allergies    Current Outpatient Prescriptions:     atorvastatin (LIPITOR) 10 mg tablet, Take 1 tablet by mouth daily, Disp: , Rfl:     Cholecalciferol (VITAMIN D3) 2000 units capsule, Take 1 capsule by mouth daily, Disp: , Rfl:     cyanocobalamin (CVS VITAMIN B-12) 1000 MCG tablet, Take 1 tablet by mouth daily, Disp: , Rfl:     desvenlafaxine (PRISTIQ) 100 mg 24 hr tablet, Take 1 tablet by mouth daily, Disp: , Rfl:     furosemide (LASIX) 40 mg tablet, Take 1 tablet by mouth daily, Disp: , Rfl:     levothyroxine (SYNTHROID) 100 mcg tablet, Take 1 tablet by mouth daily, Disp: , Rfl:     Multiple Vitamin (MULTI-VITAMIN DAILY) TABS, Take 1 tablet by mouth daily, Disp: , Rfl:     pregabalin (LYRICA) 75 mg capsule, Take 1 capsule by mouth 3 (three) times a day, Disp: , Rfl:     pyridoxine (CVS VITAMIN B-6) 100 MG tablet, Take 1 tablet by mouth daily, Disp: , Rfl:     Review of Systems   Constitutional: Positive for unexpected weight change  Negative for activity change, appetite change, chills, diaphoresis, fatigue and fever  HENT: Negative for trouble swallowing and voice change  Eyes: Negative for visual disturbance  Respiratory: Negative for shortness of breath  Cardiovascular: Negative for chest pain and palpitations  Gastrointestinal: Negative for abdominal pain, constipation and diarrhea  Endocrine: Negative for cold intolerance, heat intolerance, polydipsia, polyphagia and polyuria  Genitourinary: Negative for frequency and menstrual problem  Musculoskeletal: Positive for arthralgias, back pain, myalgias and neck pain  Skin: Negative for rash  Allergic/Immunologic: Negative for food allergies  Neurological: Negative for dizziness and tremors  Hematological: Negative for adenopathy  Psychiatric/Behavioral: Negative for sleep disturbance  All other systems reviewed and are negative  Physical Exam:  Body mass index is 51 34 kg/m²  /82   Pulse 74   Ht 4' 8" (1 422 m)   Wt 104 kg (229 lb)   BMI 51 34 kg/m²     Wt Readings from Last 3 Encounters:   02/26/18 104 kg (229 lb)   08/21/17 102 kg (225 lb)   02/16/17 98 9 kg (218 lb 0 1 oz)       Physical Exam   Constitutional: She is oriented to person, place, and time  She appears well-developed and well-nourished  No distress  Obese   HENT:   Head: Normocephalic and atraumatic  Eyes: Conjunctivae are normal  Pupils are equal, round, and reactive to light  Neck: Normal range of motion  Neck supple  No thyromegaly present  Cardiovascular: Normal rate, regular rhythm and normal heart sounds  Pulmonary/Chest: Effort normal and breath sounds normal  No respiratory distress  She has no wheezes  She has no rales  Abdominal: Soft  Bowel sounds are normal  She exhibits no distension  There is no tenderness  Musculoskeletal: Normal range of motion  She exhibits no edema  Slow gait with cane   Neurological: She is alert and oriented to person, place, and time  Skin: Skin is warm and dry  Psychiatric: She has a normal mood and affect  Vitals reviewed        Labs:       Lab Results   Component Value Date    CREATININE 0 60 10/17/2017    CREATININE 0 70 06/06/2017    CREATININE 0 66 11/11/2016    BUN 15 10/17/2017     10/17/2017    K 3 8 10/17/2017     10/17/2017    CO2 32 10/17/2017     eGFR   Date Value Ref Range Status   10/17/2017 96 ml/min/1 73sq m Final       Lab Results   Component Value Date    CHOL 152 10/17/2017    HDL 52 10/17/2017    TRIG 104 10/17/2017       Lab Results   Component Value Date    ALT 36 10/17/2017    AST 29 10/17/2017    ALKPHOS 67 10/17/2017    BILITOT 0 51 10/17/2017       Lab Results   Component Value Date    FREET4 1 03 11/11/2016         Impression & Plan:    Problem List Items Addressed This Visit     Abnormal glucose     A1C 6 0 in pre-diabetes range with normal fasting glucose  Refer to prediabetes class  Discussed importance of lifestyle modification and weight loss  Relevant Orders    Hemoglobin A1c    Hyperlipidemia     LDL at goal on statin  Relevant Medications    atorvastatin (LIPITOR) 10 mg tablet    Other Relevant Orders    Comprehensive metabolic panel    Hypertension     BP slightly high today, usually under good control at past few visits and visit to PCP  She is going to work on weight loss  Relevant Medications    furosemide (LASIX) 40 mg tablet    Hypothyroidism - Primary     Continue Synthroid 100mcg daily  Relevant Medications    levothyroxine (SYNTHROID) 100 mcg tablet    Other Relevant Orders    TSH, 3rd generation    T4, free    Obesity     Discussed prediabetes class and she will look into scheduling  Not interested in MNT due to lack of insurance coverage or HNT weight loss program   She would like to work on weight loss on her own  She is asking if bariatric surgery is an option for her  She will try harder to lose weight on her own, as she has been successful in the past, but let us know if she would like a referral to get more information on surgical weight loss  Reactive hypoglycemia     Encouraged her to meet with dietician but insurance wont cover  Refer to pre-diabetes class and she can get some nutrition guidance at this class  Vitamin D deficiency     Continue supplements  Orders Placed This Encounter   Procedures    Hemoglobin A1c     Standing Status:   Future     Standing Expiration Date:   2/26/2019    Comprehensive metabolic panel     This is a patient instruction: Patient fasting for 8 hours or longer recommended  Standing Status:   Future     Standing Expiration Date:   2/26/2019    TSH, 3rd generation     This is a patient instruction: This test is non-fasting  Please drink two glasses of water morning of bloodwork  Standing Status:   Future     Standing Expiration Date:   2/26/2019    T4, free     Standing Status:   Future     Standing Expiration Date:   2/26/2019       Patient Instructions   Complete fasting lab test in 3 months    Work on diet/exercise/weight loss    Set up pre-diabetes class  Discussed with the patient and all questioned fully answered  She will call me if any problems arise  Follow-up appointment in 6 months       Counseled patient on diagnostic results, prognosis, risk and benefit of treatment options, instruction for management, importance of treatment compliance, Risk  factor reduction and impressions      Anni Zhou PA-C

## 2018-02-26 NOTE — ASSESSMENT & PLAN NOTE
Encouraged her to meet with dietician but insurance wont cover  Refer to pre-diabetes class and she can get some nutrition guidance at this class

## 2018-02-26 NOTE — PATIENT INSTRUCTIONS
Complete fasting lab test in 3 months    Work on diet/exercise/weight loss    Set up pre-diabetes class

## 2018-02-26 NOTE — ASSESSMENT & PLAN NOTE
Discussed prediabetes class and she will look into scheduling  Not interested in MNT due to lack of insurance coverage or HNT weight loss program   She would like to work on weight loss on her own  She is asking if bariatric surgery is an option for her  She will try harder to lose weight on her own, as she has been successful in the past, but let us know if she would like a referral to get more information on surgical weight loss

## 2018-03-11 DIAGNOSIS — E03.9 HYPOTHYROIDISM, UNSPECIFIED TYPE: Primary | ICD-10-CM

## 2018-03-12 RX ORDER — LEVOTHYROXINE SODIUM 100 MCG
TABLET ORAL
Qty: 90 TABLET | Refills: 3 | Status: SHIPPED | OUTPATIENT
Start: 2018-03-12 | End: 2019-03-06 | Stop reason: SDUPTHER

## 2018-04-16 ENCOUNTER — TELEPHONE (OUTPATIENT)
Dept: ENDOCRINOLOGY | Facility: CLINIC | Age: 68
End: 2018-04-16

## 2018-05-21 ENCOUNTER — TRANSCRIBE ORDERS (OUTPATIENT)
Dept: LAB | Facility: HOSPITAL | Age: 68
End: 2018-05-21

## 2018-05-21 ENCOUNTER — APPOINTMENT (OUTPATIENT)
Dept: LAB | Facility: HOSPITAL | Age: 68
End: 2018-05-21
Payer: MEDICARE

## 2018-05-21 DIAGNOSIS — R73.09 ABNORMAL GLUCOSE: ICD-10-CM

## 2018-05-21 DIAGNOSIS — E03.9 HYPOTHYROIDISM, UNSPECIFIED TYPE: ICD-10-CM

## 2018-05-21 DIAGNOSIS — E11.9 TYPE 2 DIABETES MELLITUS WITHOUT COMPLICATION, WITHOUT LONG-TERM CURRENT USE OF INSULIN (HCC): Primary | ICD-10-CM

## 2018-05-21 DIAGNOSIS — E78.5 HYPERLIPIDEMIA, UNSPECIFIED HYPERLIPIDEMIA TYPE: ICD-10-CM

## 2018-05-21 LAB
ALBUMIN SERPL BCP-MCNC: 3.7 G/DL (ref 3.5–5)
ALP SERPL-CCNC: 72 U/L (ref 46–116)
ALT SERPL W P-5'-P-CCNC: 31 U/L (ref 12–78)
ANION GAP SERPL CALCULATED.3IONS-SCNC: 6 MMOL/L (ref 4–13)
AST SERPL W P-5'-P-CCNC: 26 U/L (ref 5–45)
BILIRUB SERPL-MCNC: 0.45 MG/DL (ref 0.2–1)
BUN SERPL-MCNC: 17 MG/DL (ref 5–25)
CALCIUM SERPL-MCNC: 9.1 MG/DL (ref 8.3–10.1)
CHLORIDE SERPL-SCNC: 105 MMOL/L (ref 100–108)
CO2 SERPL-SCNC: 28 MMOL/L (ref 21–32)
CREAT SERPL-MCNC: 0.67 MG/DL (ref 0.6–1.3)
EST. AVERAGE GLUCOSE BLD GHB EST-MCNC: 140 MG/DL
GFR SERPL CREATININE-BSD FRML MDRD: 92 ML/MIN/1.73SQ M
GLUCOSE P FAST SERPL-MCNC: 93 MG/DL (ref 65–99)
HBA1C MFR BLD: 6.5 % (ref 4.2–6.3)
POTASSIUM SERPL-SCNC: 3.5 MMOL/L (ref 3.5–5.3)
PROT SERPL-MCNC: 7.8 G/DL (ref 6.4–8.2)
SODIUM SERPL-SCNC: 139 MMOL/L (ref 136–145)
T4 FREE SERPL-MCNC: 1.18 NG/DL (ref 0.76–1.46)
TSH SERPL DL<=0.05 MIU/L-ACNC: 0.49 UIU/ML (ref 0.36–3.74)

## 2018-05-21 PROCEDURE — 84443 ASSAY THYROID STIM HORMONE: CPT

## 2018-05-21 PROCEDURE — 80053 COMPREHEN METABOLIC PANEL: CPT

## 2018-05-21 PROCEDURE — 83036 HEMOGLOBIN GLYCOSYLATED A1C: CPT

## 2018-05-21 PROCEDURE — 36415 COLL VENOUS BLD VENIPUNCTURE: CPT

## 2018-05-21 PROCEDURE — 84439 ASSAY OF FREE THYROXINE: CPT

## 2018-05-22 ENCOUNTER — TELEPHONE (OUTPATIENT)
Dept: ENDOCRINOLOGY | Facility: CLINIC | Age: 68
End: 2018-05-22

## 2018-05-22 NOTE — TELEPHONE ENCOUNTER
----- Message from Paul Bonilla PA-C sent at 5/21/2018  3:52 PM EDT -----  A1C has increased to 6 5-- which is a new diagnosis of Type 2 Diabetes  In past she was not able to attend Diabetes Education/medical Nutrition therapy as insurance would cover  Now that she has the Diabetes diagnosis- they will cover these services    For now will refer for education and discuss treatment options if needed at appt with Dr Demetrius Pollard in august   Rest of labs are Normal

## 2018-07-17 ENCOUNTER — OFFICE VISIT (OUTPATIENT)
Dept: SLEEP CENTER | Facility: CLINIC | Age: 68
End: 2018-07-17
Payer: MEDICARE

## 2018-07-17 VITALS
SYSTOLIC BLOOD PRESSURE: 128 MMHG | DIASTOLIC BLOOD PRESSURE: 78 MMHG | HEART RATE: 80 BPM | HEIGHT: 57 IN | BODY MASS INDEX: 46.25 KG/M2 | WEIGHT: 214.4 LBS

## 2018-07-17 DIAGNOSIS — J44.9 OSA AND COPD OVERLAP SYNDROME (HCC): ICD-10-CM

## 2018-07-17 DIAGNOSIS — G47.33 OSA (OBSTRUCTIVE SLEEP APNEA): Primary | ICD-10-CM

## 2018-07-17 DIAGNOSIS — G47.33 OSA AND COPD OVERLAP SYNDROME (HCC): ICD-10-CM

## 2018-07-17 PROCEDURE — 99213 OFFICE O/P EST LOW 20 MIN: CPT | Performed by: INTERNAL MEDICINE

## 2018-07-17 NOTE — PROGRESS NOTES
Progress Note - [facility] Center   Evens Sanchez OAC:[birth year] MRN: [id number]      Reason for Visit:  79 y  o female here for annual follow-up    Assessment:  Doing well on current therapy of CPAP 18 cm for very severe BERTHA (AHI = 100)  Plan:  Try [facility], which should with stand the high CPAP and prevent irritation of the bridge of her nose  Follow up: One year    History of Present Illness:  History of BERTHA on PAP therapy  Fully compliant and deriving benefit  Her primary problem remains irritation of the bridge of her nose  Historical Information    Past Medical History: History reviewed  No pertinent past medical history        Past Surgical History:   Past Surgical History:   Procedure Laterality Date    NO PAST SURGERIES         Social History:   Social History     Social History    Marital status: /Civil Union     Spouse name: N/A    Number of children: N/A    Years of education: N/A     Social History Main Topics    Smoking status: Former Smoker    Smokeless tobacco: Never Used    Alcohol use No    Drug use: No    Sexual activity: Not Asked     Other Topics Concern    None     Social History Narrative    None       Family History:   Family History   Problem Relation Age of Onset    Diabetes unspecified Mother     Hypertension Mother     Hypertension Sister     Thyroid disease unspecified Sister     Thyroid disease unspecified Brother     Cancer Brother     Cancer Maternal Uncle        Medications/Allergies:      Current Outpatient Prescriptions:     atorvastatin (LIPITOR) 10 mg tablet, Take 1 tablet by mouth daily, Disp: , Rfl:     Cholecalciferol (VITAMIN D3) 2000 units capsule, Take 1 capsule by mouth daily, Disp: , Rfl:     cyanocobalamin (CVS VITAMIN B-12) 1000 MCG tablet, Take 1 tablet by mouth daily, Disp: , Rfl:     desvenlafaxine (PRISTIQ) 100 mg 24 hr tablet, Take 1 tablet by mouth daily, Disp: , Rfl:     furosemide (LASIX) 40 mg tablet, Take 1 tablet by mouth daily, Disp: , Rfl:     Multiple Vitamin (MULTI-VITAMIN DAILY) TABS, Take 1 tablet by mouth daily, Disp: , Rfl:     pregabalin (LYRICA) 75 mg capsule, Take 1 capsule by mouth 3 (three) times a day, Disp: , Rfl:     pyridoxine (CVS VITAMIN B-6) 100 MG tablet, Take 1 tablet by mouth daily, Disp: , Rfl:     SYNTHROID 100 MCG tablet, TAKE 1 TABLET EVERY DAY, Disp: 90 tablet, Rfl: 3          Objective      Vital Signs:   Vitals:    18 1100   BP: 128/78   Pulse: 80     [place] Sleepiness Scale: Total score: 3        Physical Exam:    General: Alert, appropriate, cooperative, overweight    Head: NC/AT    Skin: Warm, dry    Neuro: No motor abnormalities, cranial nerves appear intact    Extremity: No clubbing, cyanosis    PAP settin cm  DME Provider: [employer]    Counseling / Coordination of Care  Total clinic time spent today 15 minutes  Greater than 50% of total time was spent with the patient and / or family counseling and / or coordination of care  A description of the counseling / coordination of care: Discussed equipment and response to treatment  JONATHAN Sharp    Board Certified Sleep Specialist  Review of Systems      Genitourinary need to urinate more than twice a night and hot flashes at night   Cardiology ankle/leg swelling   Gastrointestinal none   Neurology frequent headaches, muscle weakness, numbness/tingling of an extremity, forgetfulness, poor concentration or confusion,  and difficulty with memory   Constitutional claustrophobia, excessive sweating at night and weight change   Integumentary rash or dry skin   Psychiatry depression   Musculoskeletal joint pain, muscle aches, back pain, legs twitching/jerking and leg cramps   Pulmonary difficulty breathing when lying flat    ENT throat clearing and ringing in ears   Endocrine frequent urination   Hematological none

## 2018-08-29 ENCOUNTER — OFFICE VISIT (OUTPATIENT)
Dept: ENDOCRINOLOGY | Facility: CLINIC | Age: 68
End: 2018-08-29
Payer: MEDICARE

## 2018-08-29 VITALS
BODY MASS INDEX: 47.15 KG/M2 | DIASTOLIC BLOOD PRESSURE: 70 MMHG | WEIGHT: 214.1 LBS | SYSTOLIC BLOOD PRESSURE: 130 MMHG | HEART RATE: 62 BPM

## 2018-08-29 DIAGNOSIS — E03.9 HYPOTHYROIDISM, UNSPECIFIED TYPE: ICD-10-CM

## 2018-08-29 DIAGNOSIS — I10 HYPERTENSION, UNSPECIFIED TYPE: ICD-10-CM

## 2018-08-29 DIAGNOSIS — E78.5 HYPERLIPIDEMIA, UNSPECIFIED HYPERLIPIDEMIA TYPE: ICD-10-CM

## 2018-08-29 DIAGNOSIS — E11.9 TYPE 2 DIABETES MELLITUS WITHOUT COMPLICATION, WITHOUT LONG-TERM CURRENT USE OF INSULIN (HCC): Primary | ICD-10-CM

## 2018-08-29 DIAGNOSIS — E55.9 VITAMIN D DEFICIENCY: ICD-10-CM

## 2018-08-29 PROCEDURE — 99214 OFFICE O/P EST MOD 30 MIN: CPT | Performed by: INTERNAL MEDICINE

## 2018-08-29 NOTE — PATIENT INSTRUCTIONS
10% - bad control"> 10% - bad control,Hemoglobin A1c (HbA1c) greater than 10% indicating poor diabetic control,Haemoglobin A1c greater than 10% indicating poor diabetic control">   Diabetes Mellitus Type 2 in Adults, Ambulatory Care   GENERAL INFORMATION:   Diabetes mellitus type 2  is a disease that affects how your body uses glucose (sugar)  Insulin helps move sugar out of the blood so it can be used for energy  Normally, when the blood sugar level increases, the pancreas makes more insulin  Type 2 diabetes develops because either the body cannot make enough insulin, or it cannot use the insulin correctly  After many years, your pancreas may stop making insulin  Common symptoms include the following:   · More hunger or thirst than usual     · Frequent urination     · Weight loss without trying     · Blurred vision  Seek immediate care for the following symptoms:   · Severe abdominal pain, or pain that spreads to your back  You may also be vomiting  · Trouble staying awake or focusing    · Shaking or sweating    · Blurred or double vision    · Breath has a fruity, sweet smell    · Breathing is deep and labored, or rapid and shallow    · Heartbeat is fast and weak  Treatment for diabetes mellitus type 2  includes keeping your blood sugar at a normal level  You must eat the right foods, and exercise regularly  You may also need medicine if you cannot control your blood sugar level with nutrition and exercise  Manage diabetes mellitus type 2:   · Check your blood sugar level  You will be taught how to check a small drop of blood in a glucose monitor  Ask your healthcare provider when and how often to check during the day  Ask your healthcare provider what your blood sugar levels should be when you check them  · Keep track of carbohydrates (sugar and starchy foods)  Your blood sugar level can get too high if you eat too many carbohydrates   Your dietitian will help you plan meals and snacks that have the right amount of carbohydrates  · Eat low-fat foods  Some examples are skinless chicken and low-fat milk  · Eat less sodium (salt)  Some examples of high-sodium foods to limit are soy sauce, potato chips, and soup  Do not add salt to food you cook  Limit your use of table salt  · Eat high-fiber foods  Foods that are a good source of fiber include vegetables, whole grain bread, and beans  · Limit alcohol  Alcohol affects your blood sugar level and can make it harder to manage your diabetes  Women should limit alcohol to 1 drink a day  Men should limit alcohol to 2 drinks a day  A drink of alcohol is 12 ounces of beer, 5 ounces of wine, or 1½ ounces of liquor  · Get regular exercise  Exercise can help keep your blood sugar level steady, decrease your risk of heart disease, and help you lose weight  Exercise for at least 30 minutes, 5 days a week  Include muscle strengthening activities 2 days each week  Work with your healthcare provider to create an exercise plan  · Check your feet each day  for injuries or open sores  Ask your healthcare provider for activities you can do if you have an open sore  · Quit smoking  If you smoke, it is never too late to quit  Smoking can worsen the problems that may occur with diabetes  Ask your healthcare provider for information about how to stop smoking if you are having trouble quitting  · Ask about your weight:  Ask healthcare providers if you need to lose weight, and how much to lose  Ask them to help you with a weight loss program  Even a 10 to 15 pound weight loss can help you manage your blood sugar level  · Carry medical alert identification  Wear medical alert jewelry or carry a card that says you have diabetes  Ask your healthcare provider where to get these items  · Ask about vaccines  Diabetes puts you at risk of serious illness if you get the flu, pneumonia, or hepatitis   Ask your healthcare provider if you should get a flu, pneumonia, or hepatitis B vaccine, and when to get the vaccine  Follow up with your healthcare provider as directed:  Write down your questions so you remember to ask them during your visits  CARE AGREEMENT:   You have the right to help plan your care  Learn about your health condition and how it may be treated  Discuss treatment options with your caregivers to decide what care you want to receive  You always have the right to refuse treatment  The above information is an  only  It is not intended as medical advice for individual conditions or treatments  Talk to your doctor, nurse or pharmacist before following any medical regimen to see if it is safe and effective for you  © 2014 [address] is for End User's use only and may not be sold, redistributed or otherwise used for commercial purposes  All illustrations and images included in CareNotes® are the copyrighted property of A D A M , Inc  or Tyler Bowman

## 2018-08-29 NOTE — PROGRESS NOTES
Tori Lomas 79 y o  female MRN: [id number]    Encounter: [id number]      Assessment/Plan     Assessment: This is a 79y o -year-old female with diabetes with hyperglycemia, vitamin D deficiency, hypertension, hyperlipidemia and hypothyroidism  Plan:  1  Type 2 diabetes with hyperglycemia-she would like to manage this with lifestyle modification  I have referred her to diabetes self-management training and medical nutrition therapy  She is also interested in weight loss so I referred her to [employer] and nutrition technology  Check hemoglobin A1c  I referred her for an eye exam     2   Hypertension-the blood pressure is at goal     3   Hyperlipidemia-check lipid panel  4   Hypothyroidism-continue thyroid hormone replacement  5   Vitamin-D deficiency-check vitamin-D level  CC: Diabetes    History of Present Illness     HPI:  71-year-old woman with newly diagnosed type 2 diabetes who is currently not on any medications  She states that she has been decreasing her carbohydrate intake  She denies any polyuria, polydipsia and nocturia  Review of Systems   Constitutional: Negative for chills and fever  Respiratory: Negative for shortness of breath  Cardiovascular: Negative for chest pain  Gastrointestinal: Negative for constipation, diarrhea, nausea and vomiting  Endocrine: Negative for polydipsia and polyuria  Neurological: Negative for numbness  All other systems reviewed and are negative  Historical Information   History reviewed  No pertinent past medical history    Past Surgical History:   Procedure Laterality Date    NO PAST SURGERIES       Social History   History   Alcohol Use No     History   Drug Use No     History   Smoking Status    Former Smoker   Smokeless Tobacco    Never Used     Family History:   Family History   Problem Relation Age of Onset    Diabetes unspecified Mother    Reesa Betsyichmann Hypertension Mother     Hypertension Sister     Thyroid disease unspecified Sister  Thyroid disease unspecified Brother     Cancer Brother     Cancer Maternal Uncle        Meds/Allergies   Current Outpatient Prescriptions   Medication Sig Dispense Refill    atorvastatin (LIPITOR) 10 mg tablet Take 1 tablet by mouth daily      Cholecalciferol (VITAMIN D3) 2000 units capsule Take 1 capsule by mouth daily      cyanocobalamin ([employer] VITAMIN B-12) 1000 MCG tablet Take 1 tablet by mouth daily      desvenlafaxine (PRISTIQ) 100 mg 24 hr tablet Take 1 tablet by mouth daily      furosemide (LASIX) 40 mg tablet Take 1 tablet by mouth daily      Multiple Vitamin (MULTI-VITAMIN DAILY) TABS Take 1 tablet by mouth daily      pregabalin (LYRICA) 75 mg capsule Take 1 capsule by mouth 3 (three) times a day        pyridoxine ([employer] VITAMIN B-6) 100 MG tablet Take 1 tablet by mouth daily      SYNTHROID 100 MCG tablet TAKE 1 TABLET EVERY DAY 90 tablet 3     No current facility-administered medications for this visit  No Known Allergies    Objective   Vitals: Blood pressure 130/70, pulse 62, weight 97 1 kg (214 lb 1 6 oz)  Physical Exam   Constitutional: She is oriented to person, place, and time  She appears well-developed and well-nourished  No distress  HENT:   Head: Normocephalic and atraumatic  Mouth/Throat: Oropharynx is clear and moist and mucous membranes are normal  No oropharyngeal exudate  Eyes: Conjunctivae, EOM and lids are normal  Right eye exhibits no discharge  Left eye exhibits no discharge  No scleral icterus  Neck: Neck supple  No thyromegaly present  Cardiovascular: Normal rate, regular rhythm and normal heart sounds  Exam reveals no gallop and no friction rub  No murmur heard  Pulmonary/Chest: Effort normal and breath sounds normal  No respiratory distress  She has no wheezes  Abdominal: Soft  Bowel sounds are normal  She exhibits no distension  There is no tenderness  Obese   Musculoskeletal: Normal range of motion   She exhibits no edema, tenderness or deformity  Lymphadenopathy:        Head (right side): No occipital adenopathy present  Head (left side): No occipital adenopathy present  Right: No supraclavicular adenopathy present  Left: No supraclavicular adenopathy present  Neurological: She is alert and oriented to person, place, and time  No cranial nerve deficit  Skin: Skin is warm and intact  No rash noted  She is not diaphoretic  No erythema  Psychiatric: She has a normal mood and affect  Her behavior is normal    Vitals reviewed  The history was obtained from the review of the chart, patient  Lab Results:   Lab Results   Component Value Date/Time    Hemoglobin A1C 6 5 (H) 05/21/2018 08:01 AM    WBC 5 74 10/17/2017 08:22 AM    Hemoglobin 12 8 10/17/2017 08:22 AM    Hematocrit 39 8 10/17/2017 08:22 AM    MCV 90 10/17/2017 08:22 AM    Platelets 134 53/89/7107 08:22 AM    BUN 17 05/21/2018 08:01 AM    BUN 15 10/17/2017 08:22 AM    Sodium 139 05/21/2018 08:01 AM    Sodium 142 10/17/2017 08:22 AM    Potassium 3 5 05/21/2018 08:01 AM    Potassium 3 8 10/17/2017 08:22 AM    Chloride 105 05/21/2018 08:01 AM    Chloride 105 10/17/2017 08:22 AM    CO2 28 05/21/2018 08:01 AM    CO2 32 10/17/2017 08:22 AM    Creatinine 0 67 05/21/2018 08:01 AM    Creatinine 0 60 10/17/2017 08:22 AM    AST 26 05/21/2018 08:01 AM    AST 29 10/17/2017 08:22 AM    ALT 31 05/21/2018 08:01 AM    ALT 36 10/17/2017 08:22 AM    Albumin 3 7 05/21/2018 08:01 AM    Albumin 4 0 10/17/2017 08:22 AM    HDL, Direct 52 10/17/2017 08:22 AM    Triglycerides 104 10/17/2017 08:22 AM           Imaging Studies: I have personally reviewed pertinent reports  Portions of the record may have been created with voice recognition software  Occasional wrong word or "sound a like" substitutions may have occurred due to the inherent limitations of voice recognition software  Read the chart carefully and recognize, using context, where substitutions have occurred

## 2018-09-06 ENCOUNTER — OFFICE VISIT (OUTPATIENT)
Dept: DIABETES SERVICES | Facility: CLINIC | Age: 68
End: 2018-09-06
Payer: MEDICARE

## 2018-09-06 DIAGNOSIS — E11.9 TYPE 2 DIABETES MELLITUS WITHOUT COMPLICATION, WITHOUT LONG-TERM CURRENT USE OF INSULIN (HCC): Primary | ICD-10-CM

## 2018-09-06 PROCEDURE — G0108 DIAB MANAGE TRN  PER INDIV: HCPCS | Performed by: DIETITIAN, REGISTERED

## 2018-09-06 NOTE — PROGRESS NOTES
Type 2 Diabetes Class Assessment    HPI: Met with Jas Amin for DSME Initial visit  Tomasz Vazquez has Type 2 Diabetes  Diabetes Assessment  Visit Type: Initial visit  Present at Session: spouse   Special Learning Needs: No  Barriers to Learning: hearing    How do you feel about making lifestyle changes at this time? Doesn't like it but understands that she needs to  How would you rate your current knowledge of diabetes? poor  How confident are you that will be able to take better control of your diabetes?: very good    How long have you had diabetes? 3 months  Have you had diabetes education in the past?: No  Do you have any family members with diabetes?: Yes - sister and mother  Do you monitor your blood sugar? yes  Type of blood sugar monitor: Freestyle   How old is your meter?: 4-5 months   How often do you test your blood sugars?:one time per day  Do you keep a written record of your blood sugars? Yes   Blood sugar log with patient today and reviewed by educator?: No   Blood Sugar ranges:    Fastin-87    Before meals: n/a   2 hours after meals: n/a  Any financial concerns pertaining to your diabetes supplies, medication or care?: Yes - cannot afford care without insurance/medicare  Have you ever experienced hypoglycemia?:  Yes, 69 one time  Have you ever been hospitalized or gone to the ER due to your blood sugars?: No  How do you treat low blood sugars?: educated  How do you treat high blood sugars? educated  Do you wear a Diabetes I D ?: no  Where do you dispose of your sharps (needles,lancetes)?: educate    Ht Readings from Last 1 Encounters:   18 4' 8 5" (1 435 m)     Wt Readings from Last 3 Encounters:   18 97 1 kg (214 lb 1 6 oz)   18 97 3 kg (214 lb 6 4 oz)   18 104 kg (229 lb)        There is no height or weight on file to calculate BMI       Lab Results   Component Value Date    HGBA1C 6 5 (H) 2018    HGBA1C 6 0 2017    HGBA1C 5 7 2016       Lab Results Component Value Date    CHOL 169 05/24/2014     Lab Results   Component Value Date    HDL 52 10/17/2017    HDL 59 06/06/2017    HDL 67 (H) 11/11/2016     Lab Results   Component Value Date    LDLCALC 79 10/17/2017    LDLCALC 84 06/06/2017    LDLCALC 87 11/11/2016     Lab Results   Component Value Date    TRIG 104 10/17/2017    TRIG 82 06/06/2017    TRIG 77 11/11/2016     No components found for: CHOLHDL  No results found for: Marc Feliciano    Weight Change: Yes ,intentional 15 lb weight loss over last three months    Diet Assessment    Do you follow any special diet presently?: Yes  Who cooks at home?:  patient  Who does the grocery shopping?: patient   How frequently do you eat out?: hardly ever    Activity Assessment    Exercise: walks dog, limited due to back and knee issues    Lifestyle/Social Assessment    Racial/ethnic group: [facility]                                      Primary Language: English  Marital Status:   Education Level: High School Graduate   Work status: Retired  Type of job and hours: n/a  Who lives in your household?: spouse  Who is you primary support person(s): spouse   Describe your quality of life currently?: fair  Any concerns for your safety?: No  Any Anabaptist or cultural practices that may affect your diabetes care: No  Do you have a decrease or loss of hearing?: Yes - hearing aide  Do you have a decrease or loss of vision?: Yes - she believes it has worsened over the past year  Appointment for September 19, 2018 for eye exam   When was the last time you had an ophthalmology exam?: 2 years ago, dialted  When was the last time you had dental exam?: 1 year ago  Do you check your feet for cracks, sores, debris?: Yes - neuropathy in feet and gets nails cut monthly  Checks own feet regularly at home    When was the last time you had podiatry or foot exam?: August 2018  Last flu shot?: October 2017  Pneumonia shot?: No      The patient's history was reviewed and updated as appropriate: allergies, current medications  Intervention    Diabetes Overview :   Lilly Michelle was instructed on basic concepts of diabetes, including identifying role of diabetes self management, basic pathophysiology and types of diabetes, A1c and blood sugar targets  Lilly Michelle has good understanding of material covered  Taking Medications: Instructed patient on action, side effects, efficacy, prescribed dosage and appropriate timing and frequency of administration of her diabetes medication  Lilly Michelle has good understanding of material covered  Monitoring Blood Sugars  Instructions for Meter Teaching- Patient instructed in the following:  Site selection and skin preparation, Loading strips and lancet device, meter activation, obtaining blood sample, test strip and lancet disposal and recording log book entries  Patient has good understanding of material covered and was able to test their own blood sugar in office today  Testing frequency: Encouraged pair testing  Test sugars per physician instructions  Test sugars one time per day  7 days a week  Goal Blood Sugars:   Premeal , even better <110  2hr after a meal <180, even better <140  A1C <7%, even better <6 5%  Hypoglycemia: Instructed patient on definition/risk of hypoglycemia, treatment, causes/symptoms, when to notify provider of lows, prevention of hypoglycemia and exercise precautions  Comments: Bevely Button understanding of hypoglycemia concepts      Physical Activity: Discussed benefits of physical activity to optimize blood glucose control, encouraged activity at patient is physically able  Always consult a physician prior to starting an exercise program   Comments: Bevely Button understanding of hypoglycemia concepts        Diabetes Education Record  Lilly Michelle received the following handouts: Class assessment folder        Patient response to instruction    Comprehensiongood  Motivationfair  Expected Compliancefair    Thank you for referring your patient to [facility], it was a pleasure working with them today  Please feel free to call with any questions or concerns      Bessy Taylor  [address] [facility], [address] [facility] [address]

## 2018-09-06 NOTE — Clinical Note
Patient seen for class assessment  She has freestyle Lite meter but no prescriptions  Please send prescriptions to pharmacy for test strips, lancets, and control solution

## 2018-09-06 NOTE — PATIENT INSTRUCTIONS
Schedule Medical Nutrition Therapy (Nutrition) appointment  Class Assessment AVS    You are scheduled to attend Living Well with Diabetes Classes starting: October 9th at [address]  Please bring a copy of your blood sugar log and pen with you to class  Testing frequency: 1 time per day    Goal Blood Sugars:   Premeal , even better <110  2hr after a meal <180, even better <140  A1C <7%, even better <6 5%  Thank you for coming to the [facility] for education today  Please feel free to call with any questions or concerns  [facility] [phone]

## 2018-09-18 DIAGNOSIS — E11.65 TYPE 2 DIABETES MELLITUS WITH HYPERGLYCEMIA, UNSPECIFIED WHETHER LONG TERM INSULIN USE (HCC): ICD-10-CM

## 2018-09-18 DIAGNOSIS — E55.9 VITAMIN D DEFICIENCY: Primary | ICD-10-CM

## 2018-09-18 DIAGNOSIS — E11.65 TYPE 2 DIABETES MELLITUS WITH HYPERGLYCEMIA, UNSPECIFIED WHETHER LONG TERM INSULIN USE (HCC): Primary | ICD-10-CM

## 2018-09-18 DIAGNOSIS — E78.5 HYPERLIPIDEMIA, UNSPECIFIED HYPERLIPIDEMIA TYPE: ICD-10-CM

## 2018-09-18 RX ORDER — BLOOD-GLUCOSE METER
KIT MISCELLANEOUS
Qty: 100 EACH | Refills: 1 | Status: SHIPPED | OUTPATIENT
Start: 2018-09-18 | End: 2019-03-06 | Stop reason: SDUPTHER

## 2018-09-18 RX ORDER — LANCETS 28 GAUGE
EACH MISCELLANEOUS
Qty: 100 EACH | Refills: 1 | Status: SHIPPED | OUTPATIENT
Start: 2018-09-18 | End: 2020-07-15 | Stop reason: ALTCHOICE

## 2018-09-20 ENCOUNTER — LAB (OUTPATIENT)
Dept: LAB | Facility: HOSPITAL | Age: 68
End: 2018-09-20
Attending: INTERNAL MEDICINE
Payer: MEDICARE

## 2018-09-20 ENCOUNTER — TELEPHONE (OUTPATIENT)
Dept: ENDOCRINOLOGY | Facility: CLINIC | Age: 68
End: 2018-09-20

## 2018-09-20 DIAGNOSIS — E55.9 VITAMIN D DEFICIENCY: ICD-10-CM

## 2018-09-20 DIAGNOSIS — E78.5 HYPERLIPIDEMIA, UNSPECIFIED HYPERLIPIDEMIA TYPE: ICD-10-CM

## 2018-09-20 DIAGNOSIS — E11.65 TYPE 2 DIABETES MELLITUS WITH HYPERGLYCEMIA, UNSPECIFIED WHETHER LONG TERM INSULIN USE (HCC): ICD-10-CM

## 2018-09-20 LAB
25(OH)D3 SERPL-MCNC: 45 NG/ML (ref 30–100)
ALBUMIN SERPL BCP-MCNC: 3.7 G/DL (ref 3.5–5)
ALP SERPL-CCNC: 73 U/L (ref 46–116)
ALT SERPL W P-5'-P-CCNC: 30 U/L (ref 12–78)
ANION GAP SERPL CALCULATED.3IONS-SCNC: 4 MMOL/L (ref 4–13)
AST SERPL W P-5'-P-CCNC: 28 U/L (ref 5–45)
BILIRUB SERPL-MCNC: 0.53 MG/DL (ref 0.2–1)
BUN SERPL-MCNC: 17 MG/DL (ref 5–25)
CALCIUM SERPL-MCNC: 8.9 MG/DL (ref 8.3–10.1)
CHLORIDE SERPL-SCNC: 103 MMOL/L (ref 100–108)
CHOLEST SERPL-MCNC: 134 MG/DL (ref 50–200)
CO2 SERPL-SCNC: 31 MMOL/L (ref 21–32)
CREAT SERPL-MCNC: 0.74 MG/DL (ref 0.6–1.3)
CREAT UR-MCNC: 165 MG/DL
EST. AVERAGE GLUCOSE BLD GHB EST-MCNC: 123 MG/DL
GFR SERPL CREATININE-BSD FRML MDRD: 85 ML/MIN/1.73SQ M
GLUCOSE SERPL-MCNC: 92 MG/DL (ref 65–140)
HBA1C MFR BLD: 5.9 % (ref 4.2–6.3)
HDLC SERPL-MCNC: 49 MG/DL (ref 40–60)
LDLC SERPL CALC-MCNC: 71 MG/DL (ref 0–100)
MICROALBUMIN UR-MCNC: 27.5 MG/L (ref 0–20)
MICROALBUMIN/CREAT 24H UR: 17 MG/G CREATININE (ref 0–30)
POTASSIUM SERPL-SCNC: 4.2 MMOL/L (ref 3.5–5.3)
PROT SERPL-MCNC: 7.9 G/DL (ref 6.4–8.2)
SODIUM SERPL-SCNC: 138 MMOL/L (ref 136–145)
TRIGL SERPL-MCNC: 68 MG/DL

## 2018-09-20 PROCEDURE — 36415 COLL VENOUS BLD VENIPUNCTURE: CPT | Performed by: INTERNAL MEDICINE

## 2018-09-20 PROCEDURE — 82570 ASSAY OF URINE CREATININE: CPT | Performed by: INTERNAL MEDICINE

## 2018-09-20 PROCEDURE — 83036 HEMOGLOBIN GLYCOSYLATED A1C: CPT | Performed by: INTERNAL MEDICINE

## 2018-09-20 PROCEDURE — 82306 VITAMIN D 25 HYDROXY: CPT | Performed by: INTERNAL MEDICINE

## 2018-09-20 PROCEDURE — 82043 UR ALBUMIN QUANTITATIVE: CPT | Performed by: INTERNAL MEDICINE

## 2018-09-20 PROCEDURE — 80061 LIPID PANEL: CPT

## 2018-09-20 PROCEDURE — 80053 COMPREHEN METABOLIC PANEL: CPT | Performed by: INTERNAL MEDICINE

## 2018-09-20 NOTE — PROGRESS NOTES
Please call the patient regarding her abnormal result  Hemoglobin A1c has improved    The rest of the laboratory testing is normal

## 2018-09-25 ENCOUNTER — TELEPHONE (OUTPATIENT)
Dept: ENDOCRINOLOGY | Facility: CLINIC | Age: 68
End: 2018-09-25

## 2018-09-25 NOTE — TELEPHONE ENCOUNTER
Patient would like a call back to go over her lab results, specifically for the Vitamin D and her A1C  Her number is [phone]

## 2018-10-09 ENCOUNTER — OFFICE VISIT (OUTPATIENT)
Dept: DIABETES SERVICES | Facility: CLINIC | Age: 68
End: 2018-10-09
Payer: MEDICARE

## 2018-10-09 DIAGNOSIS — E11.9 TYPE 2 DIABETES MELLITUS WITHOUT COMPLICATION, WITHOUT LONG-TERM CURRENT USE OF INSULIN (HCC): ICD-10-CM

## 2018-10-09 PROCEDURE — G0109 DIAB MANAGE TRN IND/GROUP: HCPCS

## 2018-10-09 NOTE — PROGRESS NOTES
Living Well with Diabetes Group Class #1    Lionel Nadir attended the Living Well with Diabetes Group Class #1  Topics Covered in class today include: What is diabetes; Types of Diabetes; How Diabetes is diagnosed; Management skills; the role of exercise in blood sugar managements, Home glucose monitoring, and target ranges  Nanette Husain participated in group activities    The patient's history was reviewed and updated as appropriate: allergies, current medications  Lab Results   Component Value Date    HGBA1C 5 9 09/20/2018       Diabetes Education Record  Nanette Husain was provided Living Well with Diabetes Class #1 book      Patient response to instruction    Comprehensiongood  Motivationgood  Expected Compliancegood    Begin Time: 930   End Time: 2213  Referring Provider: Dr Leena Bennett you for referring your patient to [facility], it was a pleasure working with them today  Please feel free to call with any questions or concerns      Franck Small RN  [facility] [address] [address]

## 2018-10-10 ENCOUNTER — TELEPHONE (OUTPATIENT)
Dept: ENDOCRINOLOGY | Facility: CLINIC | Age: 68
End: 2018-10-10

## 2018-10-16 ENCOUNTER — OFFICE VISIT (OUTPATIENT)
Dept: DIABETES SERVICES | Facility: CLINIC | Age: 68
End: 2018-10-16
Payer: MEDICARE

## 2018-10-16 DIAGNOSIS — E11.9 TYPE 2 DIABETES MELLITUS WITHOUT COMPLICATION, WITHOUT LONG-TERM CURRENT USE OF INSULIN (HCC): Primary | ICD-10-CM

## 2018-10-16 PROCEDURE — G0109 DIAB MANAGE TRN IND/GROUP: HCPCS | Performed by: DIETITIAN, REGISTERED

## 2018-10-16 NOTE — PATIENT INSTRUCTIONS
1  Find serving size and "total carbohydrates" on a food label  Calculate the total carbs by multiplying by the number of servings you ate  You can also use  internet search, phone apps, carb books to count carbs  2  Based on the number of carbs you should be eating at each meal, plan your meal by choosing the carb foods that fit in your plan (30-45 grams (2-3 choices) per meal for women; 45-60 grams (3-4 choices) per meal for men)  3  Test your blood sugar before and 2 hours after the meal to see if this is the right amount of carbs for you  If your blood sugar is higher than target, you may need to eat a smaller amount of carbs or a different type of carb food like a whole grain  4  Using list given, try to increase soluble fiber to >10 grams per day

## 2018-10-16 NOTE — PROGRESS NOTES
Living Well with Diabetes Group Class #2    Keily Rueda attended the Living Well with Diabetes Group Class #2  During class, Ghulam Sterling was instructed on the following topics: Macronutrients, Carbohydrate sources, What one serving of carbohydrate equals, effects of diet on blood glucose levels, effect of carbohydrates on blood glucose levels, basics of meal planning: balance, portions, meal times, measurements, reading food labels to determine carbohydrates  Ghulam Sterling participated in group activities of reading labels together and completing the meal planning activity  Ghulam Sterling will follow up with class #3  Will call with any questions or concerns prior to next session  The patient's history was reviewed and updated as appropriate: allergies, current medications  Lab Results   Component Value Date    HGBA1C 5 9 09/20/2018       Diabetes Education Record  Ghulam Sterling was provided Living Well with Diabetes Class #2 book      Patient response to instruction    Comprehensiongood  Motivationgood  Expected Compliancegood      Thank you for referring your patient to [facility], it was a pleasure working with them today  Please feel free to call with any questions or concerns      Nicolas Hooper RD [facility] [phone]

## 2018-10-23 ENCOUNTER — OFFICE VISIT (OUTPATIENT)
Dept: DIABETES SERVICES | Facility: CLINIC | Age: 68
End: 2018-10-23
Payer: MEDICARE

## 2018-10-23 DIAGNOSIS — E11.9 TYPE 2 DIABETES MELLITUS WITHOUT COMPLICATION, WITHOUT LONG-TERM CURRENT USE OF INSULIN (HCC): Primary | ICD-10-CM

## 2018-10-23 PROCEDURE — G0109 DIAB MANAGE TRN IND/GROUP: HCPCS | Performed by: DIETITIAN, REGISTERED

## 2018-10-24 NOTE — PROGRESS NOTES
Living Well with Diabetes Group Class #3    Raquel Vargas attended the Living Well with Diabetes Group Class #3  During class, Mariana Spear was instructed on the following topics: Oral and injectable medications, short term complications of diabetes, long term complications of diabetes, prevention of complications, foot care, sick day management, stress management, and traveling with diabetes  Mariana Spear participated in group activities  Mariana Rainer will follow up with class #4  Will call with any questions or concerns prior to next session  The patient's history was reviewed and updated as appropriate: allergies, current medications  Lab Results   Component Value Date    HGBA1C 5 9 09/20/2018       Diabetes Education Record  Mariana Spear was provided Living Well with Diabetes Class #3 book      Patient response to instruction    Comprehensionvery good  Motivationvery good  Expected Compliancevery good    Begin Time: 9:30  End Time: 11:40  Referring Provider: Rosalie Hilario    Thank you for referring your patient to [facility], it was a pleasure working with them today  Please feel free to call with any questions or concerns      Odessa Andre  [address] [address]

## 2018-10-30 ENCOUNTER — OFFICE VISIT (OUTPATIENT)
Dept: DIABETES SERVICES | Facility: CLINIC | Age: 68
End: 2018-10-30
Payer: MEDICARE

## 2018-10-30 DIAGNOSIS — E11.9 TYPE 2 DIABETES MELLITUS WITHOUT COMPLICATION, WITHOUT LONG-TERM CURRENT USE OF INSULIN (HCC): Primary | ICD-10-CM

## 2018-10-30 PROCEDURE — G0109 DIAB MANAGE TRN IND/GROUP: HCPCS | Performed by: DIETITIAN, REGISTERED

## 2018-10-30 NOTE — PATIENT INSTRUCTIONS
1  Check food labels and try to choose foods with the least amount of saturated fat and sodium  2   When eating out, check nutrition analysis on the computer or phone nay so that you can make the best choices to stay within your guidelines for carb, sodium and saturated fat

## 2018-10-30 NOTE — PROGRESS NOTES
Living Well with Diabetes Group Class #4    Danny Bauman attended the Living Well with Diabetes Group Class #4  During class, Chaselissette Corbin was instructed on the following topics: Types of cholesterol, dietary sources of cholesterol, types of fat, types of fiber, reading food labels- in depth, healthy choices when dining out  Chaselissette Corbin participated in group activities of reading labels together and completed the dining out activity  Chaselissette Shaquille will follow up with class #5 or additional DSMT/MNT as desired  Will call with any questions or concerns prior to next session  The patient's history was reviewed and updated as appropriate: allergies, current medications  Lab Results   Component Value Date    HGBA1C 5 9 09/20/2018       Diabetes Education Record  Chaselissette Soledaderica was provided Living Well with Diabetes Class #4 book      Patient response to instruction    Comprehensiongood  Motivationgood  Expected Compliancegood    Thank you for referring your patient to [facility], it was a pleasure working with them today  Please feel free to call with any questions or concerns      Digna Cowart RD [facility] [phone]

## 2018-12-04 ENCOUNTER — OFFICE VISIT (OUTPATIENT)
Dept: ENDOCRINOLOGY | Facility: CLINIC | Age: 68
End: 2018-12-04
Payer: MEDICARE

## 2018-12-04 VITALS
BODY MASS INDEX: 48.36 KG/M2 | HEART RATE: 66 BPM | DIASTOLIC BLOOD PRESSURE: 80 MMHG | WEIGHT: 215 LBS | SYSTOLIC BLOOD PRESSURE: 142 MMHG | HEIGHT: 56 IN

## 2018-12-04 DIAGNOSIS — E03.9 HYPOTHYROIDISM, UNSPECIFIED TYPE: ICD-10-CM

## 2018-12-04 DIAGNOSIS — E78.5 HYPERLIPIDEMIA, UNSPECIFIED HYPERLIPIDEMIA TYPE: ICD-10-CM

## 2018-12-04 DIAGNOSIS — E55.9 VITAMIN D DEFICIENCY: ICD-10-CM

## 2018-12-04 DIAGNOSIS — E11.9 TYPE 2 DIABETES MELLITUS WITHOUT COMPLICATION, WITHOUT LONG-TERM CURRENT USE OF INSULIN (HCC): Primary | ICD-10-CM

## 2018-12-04 PROCEDURE — 99214 OFFICE O/P EST MOD 30 MIN: CPT | Performed by: PHYSICIAN ASSISTANT

## 2018-12-04 NOTE — PROGRESS NOTES
Established Patient Progress Note      Chief Complaint   Patient presents with    Diabetes Type 2          History of Present Illness:   Justin Bhat is a 79 y o  female with a history of type 2 diabetes without long term use of insulin since less than 1 year ago  Reports complications of none  Denies recent illness or hospitalizations  Denies recent severe hypoglycemic or severe hyperglycemic episodes  Denies any issues with her current regimen  home glucose monitoring: are performed regularly-- Blood sugars have been 60s to low 100s  No hypoglycemia symptoms  She has been to diabetes education and has made some medication improvements  Current regimen: none    Last Eye Exam: utd- has dry eye  Last Foot Exam: UTD dr Sheehan Fuelling routine foot care  Has hyperlipidemia: Taking atorvastatin        Patient Active Problem List   Diagnosis    Abnormal glucose    Back pain    Benign colon polyp    Blood loss anemia    Carpal tunnel syndrome    Cervical disc disorder with myelopathy    Cervical radiculopathy    Chronic myofascial pain    Gait disturbance    Hyperlipidemia    Hypertension    Hypothyroidism    Lumbar radiculopathy    Lumbar spine pain    Lumbar stenosis    Neck pain    Obesity    Reactive hypoglycemia    Sleep apnea    Spondylolisthesis of lumbar region    Vitamin D deficiency    Type 2 diabetes mellitus without complication, without long-term current use of insulin ([address] )      History reviewed  No pertinent past medical history     Past Surgical History:   Procedure Laterality Date    NO PAST SURGERIES        Family History   Problem Relation Age of Onset    Diabetes unspecified Mother     Hypertension Mother     Hypertension Sister     Thyroid disease unspecified Sister     Thyroid disease unspecified Brother     Cancer Brother     Cancer Maternal Uncle      Social History   Substance Use Topics    Smoking status: Former Smoker    Smokeless tobacco: Never Used    Alcohol use No     No Known Allergies      Current Outpatient Prescriptions:     atorvastatin (LIPITOR) 10 mg tablet, Take 1 tablet by mouth daily, Disp: , Rfl:     Cholecalciferol (VITAMIN D3) 2000 units capsule, Take 1 capsule by mouth daily, Disp: , Rfl:     cyanocobalamin ([employer]S VITAMIN B-12) 1000 MCG tablet, Take 1 tablet by mouth daily, Disp: , Rfl:     desvenlafaxine (PRISTIQ) 100 mg 24 hr tablet, Take 1 tablet by mouth daily, Disp: , Rfl:     FREESTYLE LITE test strip, Testing 1 time daily as directed Dx: E11 65, Disp: 100 each, Rfl: 1    furosemide (LASIX) 40 mg tablet, Take 1 tablet by mouth daily, Disp: , Rfl:     Lancets (FREESTYLE) lancets, Testing 1 time daily as directed Dx: E11 65, Disp: 100 each, Rfl: 1    pregabalin (LYRICA) 75 mg capsule, Take 1 capsule by mouth 3 (three) times a day  , Disp: , Rfl:     pyridoxine (CVS VITAMIN B-6) 100 MG tablet, Take 1 tablet by mouth daily, Disp: , Rfl:     SYNTHROID 100 MCG tablet, TAKE 1 TABLET EVERY DAY, Disp: 90 tablet, Rfl: 3    Review of Systems   Constitutional: Negative for activity change, appetite change, chills, diaphoresis, fatigue, fever and unexpected weight change  HENT: Negative for trouble swallowing and voice change  Eyes: Negative for visual disturbance  Respiratory: Negative for shortness of breath  Cardiovascular: Negative for chest pain and palpitations  Gastrointestinal: Negative for abdominal pain, constipation and diarrhea  Endocrine: Negative for cold intolerance, heat intolerance, polydipsia, polyphagia and polyuria  Genitourinary: Negative for frequency and menstrual problem  Musculoskeletal: Negative for arthralgias and myalgias  Skin: Negative for rash  Allergic/Immunologic: Negative for food allergies  Neurological: Negative for dizziness and tremors  Hematological: Negative for adenopathy  Psychiatric/Behavioral: Negative for sleep disturbance     All other systems reviewed and are negative  Physical Exam:  Body mass index is 48 2 kg/m²  /80   Pulse 66   Ht 4' 8" (1 422 m)   Wt 97 5 kg (215 lb)   BMI 48 20 kg/m²    Wt Readings from Last 3 Encounters:   12/04/18 97 5 kg (215 lb)   08/29/18 97 1 kg (214 lb 1 6 oz)   07/17/18 97 3 kg (214 lb 6 4 oz)       Physical Exam   Constitutional: She is oriented to person, place, and time  She appears well-developed and well-nourished  No distress  HENT:   Head: Normocephalic and atraumatic  Eyes: Pupils are equal, round, and reactive to light  Conjunctivae are normal    Neck: Normal range of motion  Neck supple  No thyromegaly present  Cardiovascular: Normal rate, regular rhythm and normal heart sounds  Pulmonary/Chest: Effort normal and breath sounds normal  No respiratory distress  She has no wheezes  She has no rales  Abdominal: Soft  Bowel sounds are normal  She exhibits no distension  There is no tenderness  Musculoskeletal: Normal range of motion  She exhibits no edema  Neurological: She is alert and oriented to person, place, and time  Skin: Skin is warm and dry  Psychiatric: She has a normal mood and affect  Vitals reviewed      Diabetic Foot Exam  Not performed today    Labs:     Lab Results   Component Value Date    HGBA1C 5 9 09/20/2018     No components found for: GLU  Lab Results   Component Value Date    HGBA1C 5 9 09/20/2018       Lab Results   Component Value Date    CREATININE 0 74 09/20/2018    CREATININE 0 67 05/21/2018    CREATININE 0 60 10/17/2017    BUN 17 09/20/2018     11/21/2015    K 4 2 09/20/2018     09/20/2018    CO2 31 09/20/2018     eGFR   Date Value Ref Range Status   09/20/2018 85 ml/min/1 73sq m Final       Lab Results   Component Value Date    CHOL 169 05/24/2014    HDL 49 09/20/2018    TRIG 68 09/20/2018       Lab Results   Component Value Date    ALT 30 09/20/2018    AST 28 09/20/2018    ALKPHOS 73 09/20/2018    BILITOT 0 27 11/21/2015       Lab Results   Component Value Date    PQW4RVQYXSCQ 0 487 05/21/2018    PYO1BWSYSBIS 1 270 10/17/2017    MKQ9JBRXDOIP 1 390 06/06/2017     Lab Results   Component Value Date    FREET4 1 18 05/21/2018       Impression & Plan:    Problem List Items Addressed This Visit     Hyperlipidemia     Continue supplements  Hypothyroidism    Relevant Orders    TSH, 3rd generation    T4, free    Vitamin D deficiency     Continue supplements  Type 2 diabetes mellitus without complication, without long-term current use of insulin ([address] ) - Primary     Well controlled with out medication  Can reduce glucose monitoring to 3x per week  Send BG log if problems before next visit if BG consistently over 130  Continue to work on lifestyle modifications and weight loss  Relevant Orders    Hemoglobin A1C    Comprehensive metabolic panel          Orders Placed This Encounter   Procedures    TSH, 3rd generation     This is a patient instruction: This test is non-fasting  Please drink two glasses of water morning of bloodwork  Standing Status:   Future     Standing Expiration Date:   12/4/2019    T4, free     Standing Status:   Future     Standing Expiration Date:   12/4/2019    Hemoglobin A1C     Standing Status:   Future     Standing Expiration Date:   12/4/2019    Comprehensive metabolic panel     This is a patient instruction: Patient fasting for 8 hours or longer recommended  Standing Status:   Future     Standing Expiration Date:   12/4/2019       There are no Patient Instructions on file for this visit  Discussed with the patient and all questioned fully answered  She will call me if any problems arise  Follow-up appointment in 6 months       Counseled patient on diagnostic results, prognosis, risk and benefit of treatment options, instruction for management, importance of treatment compliance, Risk  factor reduction and impressions      Baltazar Elizabeth PA-C

## 2018-12-04 NOTE — ASSESSMENT & PLAN NOTE
Well controlled with out medication  Can reduce glucose monitoring to 3x per week  Send BG log if problems before next visit if BG consistently over 130  Continue to work on lifestyle modifications and weight loss

## 2019-03-06 DIAGNOSIS — E11.65 TYPE 2 DIABETES MELLITUS WITH HYPERGLYCEMIA, UNSPECIFIED WHETHER LONG TERM INSULIN USE (HCC): ICD-10-CM

## 2019-03-06 DIAGNOSIS — E03.9 HYPOTHYROIDISM, UNSPECIFIED TYPE: ICD-10-CM

## 2019-03-06 RX ORDER — LEVOTHYROXINE SODIUM 100 MCG
100 TABLET ORAL DAILY
Qty: 90 TABLET | Refills: 1 | Status: SHIPPED | OUTPATIENT
Start: 2019-03-06 | End: 2019-09-01 | Stop reason: SDUPTHER

## 2019-03-07 RX ORDER — BLOOD-GLUCOSE METER
KIT MISCELLANEOUS
Qty: 100 EACH | Refills: 1 | Status: SHIPPED | OUTPATIENT
Start: 2019-03-07 | End: 2019-06-04 | Stop reason: SDUPTHER

## 2019-05-31 ENCOUNTER — APPOINTMENT (OUTPATIENT)
Dept: LAB | Facility: HOSPITAL | Age: 69
End: 2019-05-31
Payer: COMMERCIAL

## 2019-05-31 DIAGNOSIS — E11.9 TYPE 2 DIABETES MELLITUS WITHOUT COMPLICATION, WITHOUT LONG-TERM CURRENT USE OF INSULIN (HCC): ICD-10-CM

## 2019-05-31 DIAGNOSIS — E03.9 HYPOTHYROIDISM, UNSPECIFIED TYPE: ICD-10-CM

## 2019-05-31 LAB
ALBUMIN SERPL BCP-MCNC: 3.7 G/DL (ref 3.5–5)
ALP SERPL-CCNC: 72 U/L (ref 46–116)
ALT SERPL W P-5'-P-CCNC: 27 U/L (ref 12–78)
ANION GAP SERPL CALCULATED.3IONS-SCNC: 9 MMOL/L (ref 4–13)
AST SERPL W P-5'-P-CCNC: 24 U/L (ref 5–45)
BILIRUB SERPL-MCNC: 0.42 MG/DL (ref 0.2–1)
BUN SERPL-MCNC: 14 MG/DL (ref 5–25)
CALCIUM SERPL-MCNC: 8.5 MG/DL (ref 8.3–10.1)
CHLORIDE SERPL-SCNC: 107 MMOL/L (ref 100–108)
CO2 SERPL-SCNC: 28 MMOL/L (ref 21–32)
CREAT SERPL-MCNC: 0.64 MG/DL (ref 0.6–1.3)
EST. AVERAGE GLUCOSE BLD GHB EST-MCNC: 117 MG/DL
GFR SERPL CREATININE-BSD FRML MDRD: 93 ML/MIN/1.73SQ M
GLUCOSE P FAST SERPL-MCNC: 90 MG/DL (ref 65–99)
HBA1C MFR BLD: 5.7 % (ref 4.2–6.3)
POTASSIUM SERPL-SCNC: 4 MMOL/L (ref 3.5–5.3)
PROT SERPL-MCNC: 7.4 G/DL (ref 6.4–8.2)
SODIUM SERPL-SCNC: 144 MMOL/L (ref 136–145)
T4 FREE SERPL-MCNC: 1.11 NG/DL (ref 0.76–1.46)
TSH SERPL DL<=0.05 MIU/L-ACNC: 0.45 UIU/ML (ref 0.36–3.74)

## 2019-05-31 PROCEDURE — 80053 COMPREHEN METABOLIC PANEL: CPT

## 2019-05-31 PROCEDURE — 84439 ASSAY OF FREE THYROXINE: CPT

## 2019-05-31 PROCEDURE — 84443 ASSAY THYROID STIM HORMONE: CPT

## 2019-05-31 PROCEDURE — 36415 COLL VENOUS BLD VENIPUNCTURE: CPT

## 2019-05-31 PROCEDURE — 83036 HEMOGLOBIN GLYCOSYLATED A1C: CPT

## 2019-06-04 ENCOUNTER — OFFICE VISIT (OUTPATIENT)
Dept: ENDOCRINOLOGY | Facility: CLINIC | Age: 69
End: 2019-06-04
Payer: COMMERCIAL

## 2019-06-04 VITALS
HEIGHT: 56 IN | DIASTOLIC BLOOD PRESSURE: 80 MMHG | HEART RATE: 72 BPM | WEIGHT: 218 LBS | SYSTOLIC BLOOD PRESSURE: 150 MMHG | BODY MASS INDEX: 49.04 KG/M2

## 2019-06-04 DIAGNOSIS — E03.9 HYPOTHYROIDISM, UNSPECIFIED TYPE: ICD-10-CM

## 2019-06-04 DIAGNOSIS — I10 HYPERTENSION, UNSPECIFIED TYPE: ICD-10-CM

## 2019-06-04 DIAGNOSIS — E66.01 CLASS 3 SEVERE OBESITY DUE TO EXCESS CALORIES WITH SERIOUS COMORBIDITY AND BODY MASS INDEX (BMI) OF 50.0 TO 59.9 IN ADULT (HCC): ICD-10-CM

## 2019-06-04 DIAGNOSIS — E11.9 TYPE 2 DIABETES MELLITUS WITHOUT COMPLICATION, WITHOUT LONG-TERM CURRENT USE OF INSULIN (HCC): Primary | ICD-10-CM

## 2019-06-04 DIAGNOSIS — E16.1 REACTIVE HYPOGLYCEMIA: ICD-10-CM

## 2019-06-04 DIAGNOSIS — E55.9 VITAMIN D DEFICIENCY: ICD-10-CM

## 2019-06-04 DIAGNOSIS — E78.5 HYPERLIPIDEMIA, UNSPECIFIED HYPERLIPIDEMIA TYPE: ICD-10-CM

## 2019-06-04 DIAGNOSIS — E11.65 TYPE 2 DIABETES MELLITUS WITH HYPERGLYCEMIA, UNSPECIFIED WHETHER LONG TERM INSULIN USE (HCC): ICD-10-CM

## 2019-06-04 PROCEDURE — 99214 OFFICE O/P EST MOD 30 MIN: CPT | Performed by: PHYSICIAN ASSISTANT

## 2019-06-04 RX ORDER — BLOOD-GLUCOSE METER
KIT MISCELLANEOUS
Qty: 100 EACH | Refills: 1 | Status: SHIPPED | OUTPATIENT
Start: 2019-06-04 | End: 2020-07-15 | Stop reason: ALTCHOICE

## 2019-06-06 ENCOUNTER — TELEPHONE (OUTPATIENT)
Dept: ENDOCRINOLOGY | Facility: CLINIC | Age: 69
End: 2019-06-06

## 2019-07-05 LAB
LEFT EYE DIABETIC RETINOPATHY: NORMAL
RIGHT EYE DIABETIC RETINOPATHY: NORMAL

## 2019-07-16 ENCOUNTER — OFFICE VISIT (OUTPATIENT)
Dept: SLEEP CENTER | Facility: CLINIC | Age: 69
End: 2019-07-16
Payer: MEDICARE

## 2019-07-16 VITALS
BODY MASS INDEX: 48.36 KG/M2 | WEIGHT: 215 LBS | SYSTOLIC BLOOD PRESSURE: 128 MMHG | HEART RATE: 72 BPM | HEIGHT: 56 IN | DIASTOLIC BLOOD PRESSURE: 78 MMHG

## 2019-07-16 DIAGNOSIS — G47.33 OSA (OBSTRUCTIVE SLEEP APNEA): Primary | ICD-10-CM

## 2019-07-16 PROCEDURE — 99214 OFFICE O/P EST MOD 30 MIN: CPT | Performed by: INTERNAL MEDICINE

## 2019-07-16 NOTE — PROGRESS NOTES
Progress Note - Sleep Center   Faustino Speaker OCS:[birth year] MRN: [id number]      Reason for Visit:    81 y  o female with severe obstructive sleep apnea (AHI = 100 0) is here for follow-up    Assessment:  The patient's pressure of 18 cm, feels too high and she has a difficult time prevent doing leak  Her eyes have been getting dry using her current mask  She feels that her current mask is better than her previous, but is still problematic  This could all be related to high pressure and perhaps she would do better with APAP  Her current machine was dispensed in 2013  She should be eligible for a new one  Plan:  APAP 4 to 20 cm with a new device    Follow up:  Compliance check    History of Present Illness:  Longstanding history of very severe obstructive sleep apnea (AHI = 100, diagnosed in 2009)  She has been complaining of difficulty with mask fit due to the high pressure (CPAP = 18 cm)  She finds the [facility] more comfortable than her previous, but is still not ideal     Review of Systems      Genitourinary hot flashes at night   Cardiology ankle/leg swelling   Gastrointestinal abdominal pain or cramping that disturb sleep    Neurology muscle weakness, numbness/tingling of an extremity, forgetfulness, poor concentration or confusion, , difficulty with memory and balance problems   Constitutional weight change   Integumentary itching   Psychiatry aggressiveness or irritability   Musculoskeletal joint pain, back pain and legs twitching/jerking   Pulmonary snoring   ENT throat clearing and ringing in ears   Endocrine none   Hematological none           I have reviewed and updated the review of systems as necessary     Historical Information    No past medical history on file        Past Surgical History:   Procedure Laterality Date    NO PAST SURGERIES           Social History     Socioeconomic History    Marital status: /Civil Union     Spouse name: None    Number of children: None    Years of education: None    Highest education level: None   Occupational History    None   Social Needs    Financial resource strain: None    Food insecurity:     Worry: None     Inability: None    Transportation needs:     Medical: None     Non-medical: None   Tobacco Use    Smoking status: Former Smoker    Smokeless tobacco: Never Used   Substance and Sexual Activity    Alcohol use: No    Drug use: No    Sexual activity: None   Lifestyle    Physical activity:     Days per week: None     Minutes per session: None    Stress: None   Relationships    Social connections:     Talks on phone: None     Gets together: None     Attends Oriental orthodox service: None     Active member of club or organization: None     Attends meetings of clubs or organizations: None     Relationship status: None    Intimate partner violence:     Fear of current or ex partner: None     Emotionally abused: None     Physically abused: None     Forced sexual activity: None   Other Topics Concern    None   Social History Narrative    None           History   Alcohol use: Not on file       History   Smoking Status    Not on file   Smokeless Tobacco    Not on file       Family History:   Family History   Problem Relation Age of Onset    Diabetes unspecified Mother     Hypertension Mother     Hypertension Sister     Thyroid disease unspecified Sister     Thyroid disease unspecified Brother     Cancer Brother     Cancer Maternal Uncle        Medications/Allergies:      Current Outpatient Medications:     atorvastatin (LIPITOR) 10 mg tablet, Take 1 tablet by mouth daily, Disp: , Rfl:     Cholecalciferol (VITAMIN D3) 2000 units capsule, Take 1 capsule by mouth daily, Disp: , Rfl:     cyanocobalamin (CVS VITAMIN B-12) 1000 MCG tablet, Take 1 tablet by mouth daily, Disp: , Rfl:     desvenlafaxine (PRISTIQ) 100 mg 24 hr tablet, Take 1 tablet by mouth daily, Disp: , Rfl:     FREESTYLE LITE test strip, Testing 1 time daily as directed Dx: E11 9  Patient has Type 2 Diabetes- A1C was 6 5, Disp: 100 each, Rfl: 1    furosemide (LASIX) 40 mg tablet, Take 1 tablet by mouth daily, Disp: , Rfl:     Lancets (FREESTYLE) lancets, Testing 1 time daily as directed Dx: E11 65, Disp: 100 each, Rfl: 1    pregabalin (LYRICA) 75 mg capsule, Take 1 capsule by mouth 3 (three) times a day  , Disp: , Rfl:     pyridoxine (CVS VITAMIN B-6) 100 MG tablet, Take 1 tablet by mouth daily, Disp: , Rfl:     SYNTHROID 100 MCG tablet, Take 1 tablet (100 mcg total) by mouth daily, Disp: 90 tablet, Rfl: 1      Objective    Vital Signs:   Vitals:    07/16/19 1100   BP: 128/78   Pulse: 72   Weight: 97 5 kg (215 lb)   Height: 4' 8" (1 422 m)     [place] Sleepiness Scale: Total score: 0    Physical Exam:    General: Alert, appropriate, cooperative, overweight    Head: NC/AT    Skin: Warm, dry    Neuro: No motor abnormalities, cranial nerves appear intact    Psych: Normal affect            JONATHAN Preston    Board Certified Sleep Specialist

## 2019-07-28 NOTE — ASSESSMENT & PLAN NOTE
BP slightly high today, usually under good control at past few visits and visit to PCP  She is going to work on weight loss  no

## 2019-08-30 ENCOUNTER — TRANSCRIBE ORDERS (OUTPATIENT)
Dept: ADMINISTRATIVE | Facility: HOSPITAL | Age: 69
End: 2019-08-30

## 2019-08-30 DIAGNOSIS — M54.5 LOW BACK PAIN, UNSPECIFIED BACK PAIN LATERALITY, UNSPECIFIED CHRONICITY, WITH SCIATICA PRESENCE UNSPECIFIED: Primary | ICD-10-CM

## 2019-09-01 ENCOUNTER — HOSPITAL ENCOUNTER (OUTPATIENT)
Dept: RADIOLOGY | Facility: HOSPITAL | Age: 69
Discharge: HOME | End: 2019-09-01
Attending: FAMILY MEDICINE
Payer: MEDICARE

## 2019-09-01 DIAGNOSIS — E03.9 HYPOTHYROIDISM, UNSPECIFIED TYPE: ICD-10-CM

## 2019-09-01 DIAGNOSIS — M54.5 LOW BACK PAIN, UNSPECIFIED BACK PAIN LATERALITY, UNSPECIFIED CHRONICITY, WITH SCIATICA PRESENCE UNSPECIFIED: ICD-10-CM

## 2019-09-01 PROCEDURE — 76770 US EXAM ABDO BACK WALL COMP: CPT

## 2019-09-05 RX ORDER — LEVOTHYROXINE SODIUM 100 MCG
TABLET ORAL
Qty: 90 TABLET | Refills: 1 | Status: SHIPPED | OUTPATIENT
Start: 2019-09-05 | End: 2020-02-26

## 2019-10-02 ENCOUNTER — OFFICE VISIT (OUTPATIENT)
Dept: SLEEP CENTER | Facility: CLINIC | Age: 69
End: 2019-10-02
Payer: MEDICARE

## 2019-10-02 VITALS
DIASTOLIC BLOOD PRESSURE: 70 MMHG | WEIGHT: 218 LBS | HEIGHT: 57 IN | BODY MASS INDEX: 47.03 KG/M2 | SYSTOLIC BLOOD PRESSURE: 116 MMHG

## 2019-10-02 DIAGNOSIS — G47.33 OSA (OBSTRUCTIVE SLEEP APNEA): Primary | ICD-10-CM

## 2019-10-02 PROCEDURE — 99214 OFFICE O/P EST MOD 30 MIN: CPT | Performed by: INTERNAL MEDICINE

## 2019-10-02 NOTE — PROGRESS NOTES
Progress Note - Sleep Center   Corby Rodriguez AVL:[birth year] MRN: [id number]      Reason for Visit:  81 y  o female here for PAP compliance check    Assessment:  Doing well with new PAP device  Sleep quality is improved and the patient feels less drowsy  Compliance data show utilization for greater than or equal to 70% of nights, for greater than or equal to 4 hours per night  Plan:  Adequate compliance and successful treatment    Follow up: One year    History of Present Illness:  History of BERTHA on PAP therapy  Meets adequate compliance  The patient is complaining of poor mask fit that causes mask leak during sleep  She also has sleep initiation insomnia, but is not interested in taking any medication for it  Review of Systems      Genitourinary none   Cardiology ankle/leg swelling   Gastrointestinal abdominal pain or cramping that disturb sleep    Neurology need to move extremities, numbness/tingling of an extremity, forgetfulness, poor concentration or confusion, , difficulty with memory and balance problems   Constitutional weight change   Integumentary rash or dry skin and itching   Psychiatry depression and aggressiveness or irritability   Musculoskeletal joint pain, muscle aches, back pain, legs twitching/jerking and leg cramps   Pulmonary snoring and difficulty breathing when lying flat    ENT ringing in ears   Endocrine none   Hematological none           I have reviewed and updated the review of systems as necessary    Historical Information    No past medical history on file        Past Surgical History:   Procedure Laterality Date    NO PAST SURGERIES           Social History     Socioeconomic History    Marital status: /Civil Union     Spouse name: None    Number of children: None    Years of education: None    Highest education level: None   Occupational History    None   Social Needs    Financial resource strain: None    Food insecurity:     Worry: None     Inability: None    Transportation needs:     Medical: None     Non-medical: None   Tobacco Use    Smoking status: Former Smoker    Smokeless tobacco: Former User   Substance and Sexual Activity    Alcohol use: No    Drug use: No    Sexual activity: None   Lifestyle    Physical activity:     Days per week: None     Minutes per session: None    Stress: None   Relationships    Social connections:     Talks on phone: None     Gets together: None     Attends Muslim service: None     Active member of club or organization: None     Attends meetings of clubs or organizations: None     Relationship status: None    Intimate partner violence:     Fear of current or ex partner: None     Emotionally abused: None     Physically abused: None     Forced sexual activity: None   Other Topics Concern    None   Social History Narrative    None         Family History   Problem Relation Age of Onset    Diabetes unspecified Mother     Hypertension Mother     Hypertension Sister     Thyroid disease unspecified Sister     Thyroid disease unspecified Brother     Cancer Brother     Cancer Maternal Uncle        Medications/Allergies:      Current Outpatient Medications:     atorvastatin (LIPITOR) 10 mg tablet, Take 1 tablet by mouth daily, Disp: , Rfl:     Cholecalciferol (VITAMIN D3) 2000 units capsule, Take 1 capsule by mouth daily, Disp: , Rfl:     cyanocobalamin (CVS VITAMIN B-12) 1000 MCG tablet, Take 1 tablet by mouth daily, Disp: , Rfl:     desvenlafaxine (PRISTIQ) 100 mg 24 hr tablet, Take 1 tablet by mouth daily, Disp: , Rfl:     FREESTYLE LITE test strip, Testing 1 time daily as directed Dx: E11 9    Patient has Type 2 Diabetes- A1C was 6 5, Disp: 100 each, Rfl: 1    furosemide (LASIX) 40 mg tablet, Take 1 tablet by mouth daily, Disp: , Rfl:     Lancets (FREESTYLE) lancets, Testing 1 time daily as directed Dx: E11 65, Disp: 100 each, Rfl: 1    pregabalin (LYRICA) 75 mg capsule, Take 1 capsule by mouth 3 (three) times a day , Disp: , Rfl:     pyridoxine (CVS VITAMIN B-6) 100 MG tablet, Take 1 tablet by mouth daily, Disp: , Rfl:     SYNTHROID 100 MCG tablet, TAKE 1 TABLET BY MOUTH EVERY DAY, Disp: 90 tablet, Rfl: 1      Objective    Vital Signs:   Vitals:    10/02/19 1128   BP: 116/70     [place] Sleepiness Scale: Total score: 0        Physical Exam:    General: Alert, appropriate, cooperative, overweight    Head: NC/AT    Skin: Warm, dry    Neuro: No motor abnormalities, cranial nerves appear intact    Extremity: No clubbing, cyanosis    PAP setting:   APAP 4 to 20 cm  DME Provider: [employer]  Test results:  AHI = 100 0    Counseling / Coordination of Care  Total clinic time spent today 20 minutes  A description of the counseling / coordination of care: Maintain compliance  Discussed equipment  JONATHAN Payne    Board Certified Sleep Specialist

## 2019-11-18 ENCOUNTER — TELEPHONE (OUTPATIENT)
Dept: SLEEP CENTER | Facility: CLINIC | Age: 69
End: 2019-11-18

## 2019-11-18 NOTE — TELEPHONE ENCOUNTER
Patient reports that since getting her new CPAP machine she is not sleeping well  She states that when her pressure hit 10-11 her mask blows off her face and is leaking badly  She is waking up every 30 minutes & is very frustrated  Scheduled mask fitting today

## 2019-12-11 ENCOUNTER — APPOINTMENT (OUTPATIENT)
Dept: LAB | Facility: CLINIC | Age: 69
End: 2019-12-11
Payer: MEDICARE

## 2019-12-11 ENCOUNTER — OFFICE VISIT (OUTPATIENT)
Dept: ENDOCRINOLOGY | Facility: CLINIC | Age: 69
End: 2019-12-11
Payer: MEDICARE

## 2019-12-11 VITALS
WEIGHT: 221.3 LBS | BODY MASS INDEX: 48.74 KG/M2 | SYSTOLIC BLOOD PRESSURE: 160 MMHG | HEART RATE: 72 BPM | DIASTOLIC BLOOD PRESSURE: 88 MMHG

## 2019-12-11 DIAGNOSIS — I10 HYPERTENSION, UNSPECIFIED TYPE: ICD-10-CM

## 2019-12-11 DIAGNOSIS — E78.5 HYPERLIPIDEMIA, UNSPECIFIED HYPERLIPIDEMIA TYPE: ICD-10-CM

## 2019-12-11 DIAGNOSIS — E03.9 HYPOTHYROIDISM, UNSPECIFIED TYPE: ICD-10-CM

## 2019-12-11 DIAGNOSIS — E55.9 VITAMIN D DEFICIENCY: ICD-10-CM

## 2019-12-11 DIAGNOSIS — E11.9 TYPE 2 DIABETES MELLITUS WITHOUT COMPLICATION, WITHOUT LONG-TERM CURRENT USE OF INSULIN (HCC): ICD-10-CM

## 2019-12-11 DIAGNOSIS — E11.9 TYPE 2 DIABETES MELLITUS WITHOUT COMPLICATION, WITHOUT LONG-TERM CURRENT USE OF INSULIN (HCC): Primary | ICD-10-CM

## 2019-12-11 DIAGNOSIS — E03.9 ACQUIRED HYPOTHYROIDISM: ICD-10-CM

## 2019-12-11 LAB
ALBUMIN SERPL BCP-MCNC: 4.1 G/DL (ref 3.5–5)
ALP SERPL-CCNC: 79 U/L (ref 46–116)
ALT SERPL W P-5'-P-CCNC: 36 U/L (ref 12–78)
ANION GAP SERPL CALCULATED.3IONS-SCNC: 5 MMOL/L (ref 4–13)
AST SERPL W P-5'-P-CCNC: 29 U/L (ref 5–45)
BILIRUB SERPL-MCNC: 0.51 MG/DL (ref 0.2–1)
BUN SERPL-MCNC: 9 MG/DL (ref 5–25)
CALCIUM SERPL-MCNC: 9.1 MG/DL (ref 8.3–10.1)
CHLORIDE SERPL-SCNC: 102 MMOL/L (ref 100–108)
CHOLEST SERPL-MCNC: 160 MG/DL (ref 50–200)
CO2 SERPL-SCNC: 30 MMOL/L (ref 21–32)
CREAT SERPL-MCNC: 0.77 MG/DL (ref 0.6–1.3)
CREAT UR-MCNC: 63.1 MG/DL
EST. AVERAGE GLUCOSE BLD GHB EST-MCNC: 126 MG/DL
GFR SERPL CREATININE-BSD FRML MDRD: 80 ML/MIN/1.73SQ M
GLUCOSE P FAST SERPL-MCNC: 93 MG/DL (ref 65–99)
HBA1C MFR BLD: 6 % (ref 4.2–6.3)
HDLC SERPL-MCNC: 52 MG/DL
LDLC SERPL CALC-MCNC: 87 MG/DL (ref 0–100)
MICROALBUMIN UR-MCNC: 91.6 MG/L (ref 0–20)
MICROALBUMIN/CREAT 24H UR: 145 MG/G CREATININE (ref 0–30)
POTASSIUM SERPL-SCNC: 3.8 MMOL/L (ref 3.5–5.3)
PROT SERPL-MCNC: 8.7 G/DL (ref 6.4–8.2)
SODIUM SERPL-SCNC: 137 MMOL/L (ref 136–145)
T4 FREE SERPL-MCNC: 1.17 NG/DL (ref 0.76–1.46)
TRIGL SERPL-MCNC: 103 MG/DL
TSH SERPL DL<=0.05 MIU/L-ACNC: 1.14 UIU/ML (ref 0.36–3.74)

## 2019-12-11 PROCEDURE — 82570 ASSAY OF URINE CREATININE: CPT

## 2019-12-11 PROCEDURE — 80053 COMPREHEN METABOLIC PANEL: CPT

## 2019-12-11 PROCEDURE — 82043 UR ALBUMIN QUANTITATIVE: CPT

## 2019-12-11 PROCEDURE — 36415 COLL VENOUS BLD VENIPUNCTURE: CPT

## 2019-12-11 PROCEDURE — 84439 ASSAY OF FREE THYROXINE: CPT

## 2019-12-11 PROCEDURE — 84443 ASSAY THYROID STIM HORMONE: CPT

## 2019-12-11 PROCEDURE — 80061 LIPID PANEL: CPT

## 2019-12-11 PROCEDURE — 83036 HEMOGLOBIN GLYCOSYLATED A1C: CPT

## 2019-12-11 PROCEDURE — 99214 OFFICE O/P EST MOD 30 MIN: CPT | Performed by: INTERNAL MEDICINE

## 2019-12-11 NOTE — PROGRESS NOTES
Adrianna Molina 81 y o  female MRN: [id number]    Encounter: [id number]      Assessment/Plan     Assessment: This is a 81y o -year-old female with diabetes with hyperglycemia, vitamin D deficiency, hypertension, hyperlipidemia and hypothyroidism  Plan:  1  Type 2 diabetes with hyperglycemia-her diabetes was well controlled based on A1c in June  Repeat testing today  2  Vitamin-D deficiency-continue supplementation  3  Hypertension-the blood pressure is slightly high today  This will need to be monitored over time  4  Hypothyroidism-continue Synthroid  5  Hyperlipidemia-continue statin  6  Since her diabetes and thyroid conditions are well manage, I will plan to see her as needed  She will follow up with her primary care physician for healthcare needs  CC: Diabetes    History of Present Illness     HPI:  53-year-old woman with type 2 diabetes for many years was currently not on any medications  Her diabetes is well managed with lifestyle modification  For vitamin-D deficiency, she takes cholecalciferol  She denies any myalgias  For hyperlipidemia, she is on a statin  For hypertension, she is on furosemide  She denies any headaches  For hypothyroidism, she is on Synthroid  She denies any symptoms of hypo or hyperthyroidism  Review of Systems   Constitutional: Negative for chills and fever  Respiratory: Negative for shortness of breath  Cardiovascular: Negative for chest pain  Gastrointestinal: Negative for constipation, diarrhea, nausea and vomiting  Endocrine: Negative for cold intolerance, heat intolerance, polydipsia and polyuria  All other systems reviewed and are negative        Historical Information   Past Medical History:   Diagnosis Date    Depression     History of back surgery     Hyperlipidemia     Hyperthyroidism     Neuropathy      Past Surgical History:   Procedure Laterality Date    BACK SURGERY      KNEE SURGERY      NO PAST SURGERIES  NOSE SURGERY       Social History   Social History     Substance and Sexual Activity   Alcohol Use No     Social History     Substance and Sexual Activity   Drug Use No     Social History     Tobacco Use   Smoking Status Former Smoker   Smokeless Tobacco Former User     Family History:   Family History   Problem Relation Age of Onset    Diabetes unspecified Mother    Ethelyn Poultney Hypertension Mother     Hypertension Sister     Thyroid disease unspecified Sister     Thyroid disease unspecified Brother     Cancer Brother     Cancer Maternal Uncle        Meds/Allergies   Current Outpatient Medications   Medication Sig Dispense Refill    atorvastatin (LIPITOR) 10 mg tablet Take 1 tablet by mouth daily      Cholecalciferol (VITAMIN D3) 2000 units capsule Take 1 capsule by mouth daily      cyanocobalamin (CVS VITAMIN B-12) 1000 MCG tablet Take 1 tablet by mouth daily      desvenlafaxine (PRISTIQ) 100 mg 24 hr tablet Take 1 tablet by mouth daily      FREESTYLE LITE test strip Testing 1 time daily as directed Dx: E11 9  Patient has Type 2 Diabetes- A1C was 6 5 100 each 1    furosemide (LASIX) 40 mg tablet Take 1 tablet by mouth daily      Lancets (FREESTYLE) lancets Testing 1 time daily as directed Dx: E11 65 100 each 1    pregabalin (LYRICA) 75 mg capsule Take 1 capsule by mouth 3 (three) times a day        pyridoxine (CVS VITAMIN B-6) 100 MG tablet Take 1 tablet by mouth daily      SYNTHROID 100 MCG tablet TAKE 1 TABLET BY MOUTH EVERY DAY 90 tablet 1     No current facility-administered medications for this visit  No Known Allergies    Objective   Vitals: Blood pressure 160/88, pulse 72, weight 100 kg (221 lb 4 8 oz)  Physical Exam   Constitutional: She is oriented to person, place, and time  She appears well-developed and well-nourished  No distress  Walks with a cane   HENT:   Head: Normocephalic and atraumatic     Mouth/Throat: Oropharynx is clear and moist and mucous membranes are normal  No oropharyngeal exudate  Eyes: Conjunctivae, EOM and lids are normal  Right eye exhibits no discharge  Left eye exhibits no discharge  No scleral icterus  Neck: Neck supple  No thyromegaly present  Cardiovascular: Normal rate, regular rhythm and normal heart sounds  Exam reveals no gallop and no friction rub  No murmur heard  Pulmonary/Chest: Effort normal and breath sounds normal  No respiratory distress  She has no wheezes  Abdominal: Soft  Bowel sounds are normal  She exhibits no distension  There is no tenderness  Musculoskeletal: Normal range of motion  She exhibits no edema, tenderness or deformity  Lymphadenopathy:        Head (right side): No occipital adenopathy present  Head (left side): No occipital adenopathy present  Right: No supraclavicular adenopathy present  Left: No supraclavicular adenopathy present  Neurological: She is alert and oriented to person, place, and time  No cranial nerve deficit  Skin: Skin is warm and intact  No rash noted  She is not diaphoretic  No erythema  Psychiatric: She has a normal mood and affect  Her behavior is normal    Vitals reviewed  The history was obtained from the review of the chart, patient  Lab Results:   Lab Results   Component Value Date/Time    Hemoglobin A1C 5 7 05/31/2019 08:49 AM    BUN 14 05/31/2019 08:49 AM    Potassium 4 0 05/31/2019 08:49 AM    Chloride 107 05/31/2019 08:49 AM    CO2 28 05/31/2019 08:49 AM    Creatinine 0 64 05/31/2019 08:49 AM    AST 24 05/31/2019 08:49 AM    ALT 27 05/31/2019 08:49 AM    Albumin 3 7 05/31/2019 08:49 AM           Imaging Studies: I have personally reviewed pertinent reports  Portions of the record may have been created with voice recognition software  Occasional wrong word or "sound a like" substitutions may have occurred due to the inherent limitations of voice recognition software   Read the chart carefully and recognize, using context, where substitutions have occurred

## 2019-12-16 DIAGNOSIS — E11.9 TYPE 2 DIABETES MELLITUS WITHOUT COMPLICATION, WITHOUT LONG-TERM CURRENT USE OF INSULIN (HCC): Primary | ICD-10-CM

## 2020-01-15 ENCOUNTER — TELEPHONE (OUTPATIENT)
Dept: SLEEP CENTER | Facility: CLINIC | Age: 70
End: 2020-01-15

## 2020-01-15 ENCOUNTER — OFFICE VISIT (OUTPATIENT)
Dept: SLEEP CENTER | Facility: CLINIC | Age: 70
End: 2020-01-15
Payer: MEDICARE

## 2020-01-15 VITALS
WEIGHT: 224 LBS | DIASTOLIC BLOOD PRESSURE: 76 MMHG | HEIGHT: 56 IN | SYSTOLIC BLOOD PRESSURE: 136 MMHG | BODY MASS INDEX: 50.39 KG/M2

## 2020-01-15 DIAGNOSIS — G47.33 OSA (OBSTRUCTIVE SLEEP APNEA): Primary | ICD-10-CM

## 2020-01-15 PROCEDURE — 99214 OFFICE O/P EST MOD 30 MIN: CPT | Performed by: INTERNAL MEDICINE

## 2020-01-15 NOTE — TELEPHONE ENCOUNTER
Received a script for a pressure change  Changed accordingly  Patient's now set to a pressure of 4-13cm  Changed while she was in the office

## 2020-01-15 NOTE — PROGRESS NOTES
Progress Note - Sleep Center   Marcine Sacks GBP:[birth year] MRN: [id number]      Reason for Visit:  81 y  o female here for annual follow-up    Assessment:  Struggling with pressure on current therapy of APAP 4 to 20 cm for very severe BERTHA (AHI = 100 0)  The noise of the machine bothers her, presumably a result of a flex  She also feels that the pressure is too high when it gets over 14 cm  Her ninetieth percentile pressure is 16 cm  Her mean pressure is 13 cm  Plan:  D/C A flex  Change pressure to APAP 4 to 13 cm  Consider BiPAP if this setting is not tolerated  I told the patient to call me if she is not sleeping better in one week    Follow up:  6 cm    History of Present Illness:  History of BERTHA on PAP therapy  Fully compliant but sleeping poorly      Review of Systems      Genitourinary hot flashes at night   Cardiology ankle/leg swelling   Gastrointestinal none   Neurology muscle weakness, numbness/tingling of an extremity, difficulty with memory and balance problems   Constitutional weight change   Integumentary rash or dry skin and itching   Psychiatry anxiety   Musculoskeletal joint pain and back pain   Pulmonary snoring   ENT ringing in ears   Endocrine none   Hematological none         I have reviewed and updated the review of systems as necessary      Historical Information    Past Medical History:   Past Medical History:   Diagnosis Date    Depression     History of back surgery     Hyperlipidemia     Hyperthyroidism     Neuropathy          Past Surgical History:   Past Surgical History:   Procedure Laterality Date    BACK SURGERY      KNEE SURGERY      NO PAST SURGERIES      NOSE SURGERY         Social History:   Social History     Socioeconomic History    Marital status: /Civil Union     Spouse name: None    Number of children: None    Years of education: None    Highest education level: None   Occupational History    Occupation:  at [address] Financial resource strain: None    Food insecurity:     Worry: None     Inability: None    Transportation needs:     Medical: None     Non-medical: None   Tobacco Use    Smoking status: Former Smoker    Smokeless tobacco: Former User   Substance and Sexual Activity    Alcohol use: No    Drug use: No    Sexual activity: None   Lifestyle    Physical activity:     Days per week: None     Minutes per session: None    Stress: None   Relationships    Social connections:     Talks on phone: None     Gets together: None     Attends Shinto service: None     Active member of club or organization: None     Attends meetings of clubs or organizations: None     Relationship status: None    Intimate partner violence:     Fear of current or ex partner: None     Emotionally abused: None     Physically abused: None     Forced sexual activity: None   Other Topics Concern    None   Social History Narrative    None       Family History:   Family History   Problem Relation Age of Onset    Diabetes unspecified Mother     Hypertension Mother     Hypertension Sister     Thyroid disease unspecified Sister     Thyroid disease unspecified Brother     Cancer Brother     Cancer Maternal Uncle        Medications/Allergies:      Current Outpatient Medications:     atorvastatin (LIPITOR) 10 mg tablet, Take 1 tablet by mouth daily, Disp: , Rfl:     Cholecalciferol (VITAMIN D3) 2000 units capsule, Take 1 capsule by mouth daily, Disp: , Rfl:     cyanocobalamin (CVS VITAMIN B-12) 1000 MCG tablet, Take 1 tablet by mouth daily, Disp: , Rfl:     desvenlafaxine (PRISTIQ) 100 mg 24 hr tablet, Take 1 tablet by mouth daily, Disp: , Rfl:     FREESTYLE LITE test strip, Testing 1 time daily as directed Dx: E11 9    Patient has Type 2 Diabetes- A1C was 6 5, Disp: 100 each, Rfl: 1    furosemide (LASIX) 40 mg tablet, Take 1 tablet by mouth daily, Disp: , Rfl:     Lancets (FREESTYLE) lancets, Testing 1 time daily as directed Dx: E11 65, Disp: 100 each, Rfl: 1    pregabalin (LYRICA) 75 mg capsule, Take 1 capsule by mouth 3 (three) times a day  , Disp: , Rfl:     pyridoxine (CVS VITAMIN B-6) 100 MG tablet, Take 1 tablet by mouth daily, Disp: , Rfl:     SYNTHROID 100 MCG tablet, TAKE 1 TABLET BY MOUTH EVERY DAY, Disp: 90 tablet, Rfl: 1          Objective      Vital Signs:   Vitals:    01/15/20 1148   BP: 136/76     [place] Sleepiness Scale: Total score: 6        Physical Exam:    General: Alert, appropriate, cooperative, overweight    Head: NC/AT    Skin: Warm, dry    Neuro: No motor abnormalities, cranial nerves appear intact    Extremity: No clubbing, cyanosis      DME Provider: [facility]        Counseling / Coordination of Care   I have spent 20 minutes with the patient today in which greater than 50% of this time was spent in counseling/coordination of care regarding: equipment and compliance  Board Certified Sleep Specialist    Portions of the record may have been created with voice recognition software  Occasional wrong word or "sound a like" substitutions may have occurred due to the inherent limitations of voice recognition software  Read the chart carefully and recognize, using context, where substitutions have occurred

## 2020-01-15 NOTE — TELEPHONE ENCOUNTER
Patient called & left message that she is having CPAP problems  Returned call & patient advised me that she already scheduled a follow-up appointment  She states her mask is blowing off at night & she wants to see the doctor to discuss

## 2020-01-16 ENCOUNTER — TELEPHONE (OUTPATIENT)
Dept: SLEEP CENTER | Facility: CLINIC | Age: 70
End: 2020-01-16

## 2020-02-26 DIAGNOSIS — E03.9 HYPOTHYROIDISM, UNSPECIFIED TYPE: ICD-10-CM

## 2020-02-26 RX ORDER — LEVOTHYROXINE SODIUM 100 MCG
TABLET ORAL
Qty: 90 TABLET | Refills: 1 | Status: SHIPPED | OUTPATIENT
Start: 2020-02-26 | End: 2020-08-07

## 2020-07-15 ENCOUNTER — OFFICE VISIT (OUTPATIENT)
Dept: SLEEP CENTER | Facility: CLINIC | Age: 70
End: 2020-07-15
Payer: MEDICARE

## 2020-07-15 VITALS
SYSTOLIC BLOOD PRESSURE: 130 MMHG | TEMPERATURE: 97.6 F | DIASTOLIC BLOOD PRESSURE: 80 MMHG | HEIGHT: 56 IN | BODY MASS INDEX: 50.34 KG/M2 | WEIGHT: 223.8 LBS

## 2020-07-15 DIAGNOSIS — G47.33 OSA (OBSTRUCTIVE SLEEP APNEA): Primary | ICD-10-CM

## 2020-07-15 PROCEDURE — 99214 OFFICE O/P EST MOD 30 MIN: CPT | Performed by: INTERNAL MEDICINE

## 2020-07-15 NOTE — PROGRESS NOTES
Progress Note - Sleep Center   [facility] OHE:[birth year] MRN: [id number]      Reason for Visit:  78 y  o female here for annual follow-up    Assessment:  Fully compliant but struggling on current therapy of APAP 4 to 13 cm for very severe BERTHA (AHI = 100)  Her primary complaint is that even at lower pressures, the mask seems to blow off of her face  She is currently using an F 20, but may benefit from changing her mask  In addition, she may do better on BiPAP  I have ordered a repeat titration study to help to determine best pressure  Plan:  Positive airway pressure retitration study  The patient may require BiPAP  Follow up: After polysomnography    History of Present Illness:  History of BERTHA on PAP therapy  Fully compliant, but struggling due to mask leak      Review of Systems      Genitourinary none   Cardiology ankle/leg swelling   Gastrointestinal abdominal pain or cramping that disturb sleep    Neurology frequent headaches, muscle weakness, numbness/tingling of an extremity, difficulty with memory and balance problems   Constitutional none   Integumentary none   Psychiatry anxiety   Musculoskeletal muscle aches, back pain and leg cramps   Pulmonary snoring   ENT ringing in ears   Endocrine frequent urination   Hematological none         I have reviewed and updated the review of systems as necessary      Historical Information    Past Medical History:   Past Medical History:   Diagnosis Date    Depression     History of back surgery     Hyperlipidemia     Hyperthyroidism     Neuropathy          Past Surgical History:   Past Surgical History:   Procedure Laterality Date    BACK SURGERY      KNEE SURGERY      NO PAST SURGERIES      NOSE SURGERY         Social History:   Social History     Socioeconomic History    Marital status: /Civil Union     Spouse name: None    Number of children: None    Years of education: None    Highest education level: None   Occupational History    Occupation:  at [address]  Financial resource strain: None    Food insecurity:     Worry: None     Inability: None    Transportation needs:     Medical: None     Non-medical: None   Tobacco Use    Smoking status: Former Smoker    Smokeless tobacco: Former User   Substance and Sexual Activity    Alcohol use: No    Drug use: No    Sexual activity: None   Lifestyle    Physical activity:     Days per week: None     Minutes per session: None    Stress: None   Relationships    Social connections:     Talks on phone: None     Gets together: None     Attends Bahai service: None     Active member of club or organization: None     Attends meetings of clubs or organizations: None     Relationship status: None    Intimate partner violence:     Fear of current or ex partner: None     Emotionally abused: None     Physically abused: None     Forced sexual activity: None   Other Topics Concern    None   Social History Narrative    None       Family History:   Family History   Problem Relation Age of Onset    Diabetes unspecified Mother     Hypertension Mother     Hypertension Sister     Thyroid disease unspecified Sister     Thyroid disease unspecified Brother     Cancer Brother     Cancer Maternal Uncle        Medications/Allergies:      Current Outpatient Medications:     atorvastatin (LIPITOR) 10 mg tablet, Take 1 tablet by mouth daily, Disp: , Rfl:     Cholecalciferol (VITAMIN D3) 2000 units capsule, Take 1 capsule by mouth daily, Disp: , Rfl:     cyanocobalamin (CVS VITAMIN B-12) 1000 MCG tablet, Take 1 tablet by mouth daily, Disp: , Rfl:     desvenlafaxine (PRISTIQ) 100 mg 24 hr tablet, Take 1 tablet by mouth daily, Disp: , Rfl:     furosemide (LASIX) 40 mg tablet, Take 1 tablet by mouth daily, Disp: , Rfl:     pregabalin (LYRICA) 75 mg capsule, Take 1 capsule by mouth 3 (three) times a day  , Disp: , Rfl:     pyridoxine (CVS VITAMIN B-6) 100 MG tablet, Take 1 tablet by mouth daily, Disp: , Rfl:     SYNTHROID 100 MCG tablet, TAKE 1 TABLET BY MOUTH EVERY DAY, Disp: 90 tablet, Rfl: 1          Objective      Vital Signs:   Vitals:    07/15/20 1100   BP: 130/80   Temp: 97 6 °F (36 4 °C)     [place] Sleepiness Scale: Total score: 2        Physical Exam:    General: Alert, appropriate, cooperative, overweight    Head: NC/AT    Skin: Warm, dry    Neuro: No motor abnormalities, cranial nerves appear intact    Extremity: No clubbing, cyanosis      DME Provider: [facility]        Counseling / Coordination of Care   I have spent 15 minutes with the patient today in which greater than 50% of this time was spent in counseling/coordination of care regarding: equipment and compliance  Board Certified Sleep Specialist    Portions of the record may have been created with voice recognition software  Occasional wrong word or "sound a like" substitutions may have occurred due to the inherent limitations of voice recognition software  Read the chart carefully and recognize, using context, where substitutions have occurred

## 2020-07-22 ENCOUNTER — TELEPHONE (OUTPATIENT)
Dept: SLEEP CENTER | Facility: CLINIC | Age: 70
End: 2020-07-22

## 2020-07-22 DIAGNOSIS — Z20.822 ENCOUNTER FOR LABORATORY TESTING FOR COVID-19 VIRUS: Primary | ICD-10-CM

## 2020-08-07 DIAGNOSIS — E03.9 HYPOTHYROIDISM, UNSPECIFIED TYPE: ICD-10-CM

## 2020-08-07 RX ORDER — LEVOTHYROXINE SODIUM 100 MCG
TABLET ORAL
Qty: 90 TABLET | Refills: 1 | Status: SHIPPED | OUTPATIENT
Start: 2020-08-07 | End: 2021-02-10 | Stop reason: SDUPTHER

## 2020-09-11 DIAGNOSIS — Z20.822 ENCOUNTER FOR LABORATORY TESTING FOR COVID-19 VIRUS: ICD-10-CM

## 2020-09-11 PROCEDURE — U0003 INFECTIOUS AGENT DETECTION BY NUCLEIC ACID (DNA OR RNA); SEVERE ACUTE RESPIRATORY SYNDROME CORONAVIRUS 2 (SARS-COV-2) (CORONAVIRUS DISEASE [COVID-19]), AMPLIFIED PROBE TECHNIQUE, MAKING USE OF HIGH THROUGHPUT TECHNOLOGIES AS DESCRIBED BY CMS-2020-01-R: HCPCS | Performed by: INTERNAL MEDICINE

## 2020-09-12 LAB — SARS-COV-2 RNA SPEC QL NAA+PROBE: NOT DETECTED

## 2020-09-14 ENCOUNTER — DOCUMENTATION (OUTPATIENT)
Dept: SLEEP CENTER | Facility: CLINIC | Age: 70
End: 2020-09-14

## 2020-09-22 ENCOUNTER — HOSPITAL ENCOUNTER (OUTPATIENT)
Dept: SLEEP CENTER | Facility: CLINIC | Age: 70
Discharge: HOME | End: 2020-09-22
Payer: MEDICARE

## 2020-09-22 DIAGNOSIS — G47.33 OSA (OBSTRUCTIVE SLEEP APNEA): ICD-10-CM

## 2020-09-22 PROCEDURE — 95811 POLYSOM 6/>YRS CPAP 4/> PARM: CPT

## 2020-09-23 NOTE — PROGRESS NOTES
Sleep Study Documentation    Pre-Sleep Study       Sleep testing procedure explained to patient:YES    Patient napped prior to study:   NO    Caffeine:Dayshift worker after 12PM   Caffeine use:NO    Alcohol:Dayshift workers after 5PM: Alcohol use:NO    Typical day for patient:YES       Study Documentation    Sleep Study Indications:    Previous Dx of BERTHA, currently on APAP (4-13 cm)   Hypertension   Type 2 diabetes melltius    Sleep Study: Treatment       Optimal PAP pressure:   +24/20    Leak:None  (1 episode of high mask leak, easily resolved with tightening)  Snore:Eliminated  At +4  REM Obtained:yes     Supplemental O2: no    Minimum SaO2:  80%  Baseline SaO2:   95%  Minimum SaO2 at final PAP pressure:  92%    PAP mask tried:  [employer] Simplus full-face (M)  PAP mask choice (final):  [employer] Simplus full-face (M)    Mode of Therapy:BiPAP  CPAP changed to BiPAP:  Yes  Pt required pressure beyond +20 to eliminate obstructive events  EKG abnormalities: no     EEG abnormalities: no    Sleep Study Recorded < 2 hours: N/A    Sleep Study Recorded > 2 hours but incomplete study: N/A    Sleep Study Recorded 6 hours but no sleep obtained: NO    Patient classification: disabled/retired      Post-Sleep Study    Medication used at bedtime or during sleep study:YES prescription sleep aid    Patient reports time it took to fall asleep:30 to 60 minutes    Patient reports waking up during study:3 or more times  Patient reports returning to sleep in 10 to 30 minutes  Patient reports sleeping 2 to 4 hours without dreaming  Patient reports sleep during study:typical    Patient rated sleepiness: Somewhat sleepy or tired    PAP treatment:yes: Post PAP treatment patient reports feeling unchanged and would wear PAP mask at home

## 2020-09-27 DIAGNOSIS — G47.33 OSA (OBSTRUCTIVE SLEEP APNEA): Primary | ICD-10-CM

## 2020-09-30 ENCOUNTER — TELEPHONE (OUTPATIENT)
Dept: SLEEP CENTER | Facility: CLINIC | Age: 70
End: 2020-09-30

## 2020-09-30 NOTE — TELEPHONE ENCOUNTER
Advised patient sleep study was resulted and Dr Anuel Russell recommends BIPAP  Patient scheduled for DME set up and compliance appointment   Appointment information emailed to [facility]

## 2020-10-15 ENCOUNTER — TELEPHONE (OUTPATIENT)
Dept: SLEEP CENTER | Facility: CLINIC | Age: 70
End: 2020-10-15

## 2020-10-16 ENCOUNTER — TELEPHONE (OUTPATIENT)
Dept: SLEEP CENTER | Facility: CLINIC | Age: 70
End: 2020-10-16

## 2020-10-21 ENCOUNTER — TELEPHONE (OUTPATIENT)
Dept: SLEEP CENTER | Facility: CLINIC | Age: 70
End: 2020-10-21

## 2020-10-21 DIAGNOSIS — G47.33 OSA (OBSTRUCTIVE SLEEP APNEA): Primary | ICD-10-CM

## 2020-10-22 ENCOUNTER — TELEPHONE (OUTPATIENT)
Dept: SLEEP CENTER | Facility: CLINIC | Age: 70
End: 2020-10-22

## 2020-10-27 DIAGNOSIS — G47.33 OSA (OBSTRUCTIVE SLEEP APNEA): Primary | ICD-10-CM

## 2020-10-28 ENCOUNTER — TELEPHONE (OUTPATIENT)
Dept: SLEEP CENTER | Facility: CLINIC | Age: 70
End: 2020-10-28

## 2021-01-06 ENCOUNTER — OFFICE VISIT (OUTPATIENT)
Dept: SLEEP CENTER | Facility: CLINIC | Age: 71
End: 2021-01-06
Payer: MEDICARE

## 2021-01-06 VITALS
DIASTOLIC BLOOD PRESSURE: 84 MMHG | BODY MASS INDEX: 51.29 KG/M2 | HEIGHT: 56 IN | SYSTOLIC BLOOD PRESSURE: 132 MMHG | WEIGHT: 228 LBS | HEART RATE: 74 BPM

## 2021-01-06 DIAGNOSIS — G47.33 OSA (OBSTRUCTIVE SLEEP APNEA): Primary | ICD-10-CM

## 2021-01-06 PROCEDURE — 99214 OFFICE O/P EST MOD 30 MIN: CPT | Performed by: INTERNAL MEDICINE

## 2021-01-06 NOTE — PROGRESS NOTES
Progress Note - Sleep Center   Alexis Khan VYE:[birth year] MRN: [id number]      Reason for Visit:  78 y  o female here for annual follow-up    Assessment:  Doing adequately on current therapy of BPAP Auto for very severe obstructive sleep apnea  She does complain of significant mask leak  She asked to have the pressure lowered and I will change her to CPAP 16 cm  Her compliance data shows that she sometimes exceeds inspiratory pressures of 20 cm  I reviewed data from her treatment polysomnography  Plan:  Changed to CPAP 16 cm  Continue to work on finding a more comfortable interface  Follow up: One year    History of Present Illness:  History of BERTHA on PAP therapy  Fully compliant and deriving benefit      The patient reports ankle swelling, abdominal cramping, numbness, forgetfulness, fatigue, weight change, rash/dry skin, itching, anxiety, depression, joint pain, muscle aches, back pain, sciatica, leg cramps, tenderness, nocturia    I have reviewed and updated the review of systems as necessary      Historical Information    Past Medical History:   Past Medical History:   Diagnosis Date    Depression     History of back surgery     Hyperlipidemia     Hyperthyroidism     Neuropathy          Past Surgical History:   Past Surgical History:   Procedure Laterality Date    BACK SURGERY      KNEE SURGERY      NO PAST SURGERIES      NOSE SURGERY         Social History:   Social History     Socioeconomic History    Marital status: /Civil Union     Spouse name: None    Number of children: None    Years of education: None    Highest education level: None   Occupational History    Occupation:  at [address] [place] [address] Scality resource strain: None    Food insecurity     Worry: None     Inability: None    Transportation needs     Medical: None     Non-medical: None   Tobacco Use    Smoking status: Former Smoker    Smokeless tobacco: Former User   Substance and Sexual Activity  Alcohol use: No    Drug use: No    Sexual activity: None   Lifestyle    Physical activity     Days per week: None     Minutes per session: None    Stress: None   Relationships    Social connections     Talks on phone: None     Gets together: None     Attends Yarsanism service: None     Active member of club or organization: None     Attends meetings of clubs or organizations: None     Relationship status: None    Intimate partner violence     Fear of current or ex partner: None     Emotionally abused: None     Physically abused: None     Forced sexual activity: None   Other Topics Concern    None   Social History Narrative    None       Family History:   Family History   Problem Relation Age of Onset    Diabetes unspecified Mother     Hypertension Mother     Hypertension Sister     Thyroid disease unspecified Sister     Thyroid disease unspecified Brother     Cancer Brother     Cancer Maternal Uncle        Medications/Allergies:      Current Outpatient Medications:     atorvastatin (LIPITOR) 10 mg tablet, Take 1 tablet by mouth daily, Disp: , Rfl:     Cholecalciferol (VITAMIN D3) 2000 units capsule, Take 1 capsule by mouth daily, Disp: , Rfl:     cyanocobalamin (CVS VITAMIN B-12) 1000 MCG tablet, Take 1 tablet by mouth daily, Disp: , Rfl:     desvenlafaxine (PRISTIQ) 100 mg 24 hr tablet, Take 1 tablet by mouth daily, Disp: , Rfl:     furosemide (LASIX) 40 mg tablet, Take 1 tablet by mouth daily, Disp: , Rfl:     pregabalin (LYRICA) 75 mg capsule, Take 1 capsule by mouth 3 (three) times a day  , Disp: , Rfl:     pyridoxine (CVS VITAMIN B-6) 100 MG tablet, Take 1 tablet by mouth daily, Disp: , Rfl:     Synthroid 100 MCG tablet, TAKE 1 TABLET BY MOUTH EVERY DAY, Disp: 90 tablet, Rfl: 1          Objective      Vital Signs:   Vitals:    01/06/21 1012   BP: 132/84   Pulse: 74     [place] Sleepiness Scale:  Total score: 0        Physical Exam:    General: Alert, appropriate, cooperative, overweight    Head: NC/AT    Skin: Warm, dry    Neuro: No motor abnormalities, cranial nerves appear intact    Extremity: No clubbing, cyanosis      DME Provider: [facility]        Counseling / Coordination of Care   I have spent 15 minutes with the patient today in which greater than 50% of this time was spent in counseling/coordination of care regarding: equipment and compliance  Board Certified Sleep Specialist    Portions of the record may have been created with voice recognition software  Occasional wrong word or "sound a like" substitutions may have occurred due to the inherent limitations of voice recognition software  Read the chart carefully and recognize, using context, where substitutions have occurred

## 2021-01-07 ENCOUNTER — TELEPHONE (OUTPATIENT)
Dept: SLEEP CENTER | Facility: CLINIC | Age: 71
End: 2021-01-07

## 2021-01-08 ENCOUNTER — TELEPHONE (OUTPATIENT)
Dept: SLEEP CENTER | Facility: CLINIC | Age: 71
End: 2021-01-08

## 2021-01-11 ENCOUNTER — TELEPHONE (OUTPATIENT)
Dept: SLEEP CENTER | Facility: CLINIC | Age: 71
End: 2021-01-11

## 2021-01-11 NOTE — TELEPHONE ENCOUNTER
Patient called, states DR Kari Monreal changed her pressure and she doesn't feel that she is getting any pressure  She would like it changed back to BiPAP only at a lower pressure  Advised I will get a compliance report and have Dr Kari Monreal review and call with his recommendations

## 2021-01-13 DIAGNOSIS — G47.33 OSA (OBSTRUCTIVE SLEEP APNEA): Primary | ICD-10-CM

## 2021-01-14 ENCOUNTER — TELEPHONE (OUTPATIENT)
Dept: SLEEP CENTER | Facility: CLINIC | Age: 71
End: 2021-01-14

## 2021-01-14 NOTE — TELEPHONE ENCOUNTER
RX for pressure change to [facility]    Left message for the patient that Dr Bennett Cardenas ordered pressure change and RX sent to [facility]

## 2021-01-14 NOTE — TELEPHONE ENCOUNTER
Patient called, states her  did not understand the message  I advised that Dr Osito Rojas did make a pressure change, that should be completed today and she might not notice a difference until tomorrow night  Advised to call with questions or concerns

## 2021-01-14 NOTE — TELEPHONE ENCOUNTER
Received a pressure change   Changed accoridngly   Patient's now on a Bipap/Auto    Max IPAP 16  MIn Epap 6  Min/Max PS 4/5

## 2021-01-30 ENCOUNTER — IMMUNIZATIONS (OUTPATIENT)
Dept: FAMILY MEDICINE CLINIC | Facility: HOSPITAL | Age: 71
End: 2021-01-30

## 2021-01-30 DIAGNOSIS — Z23 ENCOUNTER FOR IMMUNIZATION: Primary | ICD-10-CM

## 2021-01-30 PROCEDURE — 0001A SARS-COV-2 / COVID-19 MRNA VACCINE (PFIZER-BIONTECH) 30 MCG: CPT

## 2021-01-30 PROCEDURE — 91300 SARS-COV-2 / COVID-19 MRNA VACCINE (PFIZER-BIONTECH) 30 MCG: CPT

## 2021-02-10 DIAGNOSIS — E03.9 HYPOTHYROIDISM, UNSPECIFIED TYPE: ICD-10-CM

## 2021-02-10 NOTE — TELEPHONE ENCOUNTER
[facility] pharmacy requesting a refill fpr Synthroid 100 mcg  Last OV 12/11/2019  Pt is due for an office visit

## 2021-02-11 RX ORDER — LEVOTHYROXINE SODIUM 100 MCG
100 TABLET ORAL DAILY
Qty: 90 TABLET | Refills: 0 | Status: SHIPPED | OUTPATIENT
Start: 2021-02-11 | End: 2021-05-20

## 2021-02-19 ENCOUNTER — IMMUNIZATIONS (OUTPATIENT)
Dept: FAMILY MEDICINE CLINIC | Facility: HOSPITAL | Age: 71
End: 2021-02-19

## 2021-02-19 DIAGNOSIS — Z23 ENCOUNTER FOR IMMUNIZATION: Primary | ICD-10-CM

## 2021-02-19 PROCEDURE — 91300 SARS-COV-2 / COVID-19 MRNA VACCINE (PFIZER-BIONTECH) 30 MCG: CPT

## 2021-02-19 PROCEDURE — 0002A SARS-COV-2 / COVID-19 MRNA VACCINE (PFIZER-BIONTECH) 30 MCG: CPT

## 2021-05-04 ENCOUNTER — TELEPHONE (OUTPATIENT)
Dept: SLEEP CENTER | Facility: CLINIC | Age: 71
End: 2021-05-04

## 2021-05-04 NOTE — TELEPHONE ENCOUNTER
Patient called, states she spoke with [facility] medical and they advised that she needed to speak with the doctor that her machine is not working correctly  Patient states she does not feel like the machine is giving her any pressure, but did get a new mask  Advised I will discuss with our [employer] representative and get compliance report for Dr Patti Thomas to review and will call with his recommendations

## 2021-05-04 NOTE — TELEPHONE ENCOUNTER
I spoke with James Henley at [address], per their notes patient was noncompliant for the first 90 days, she was scheduled for a mask fitting and recived a F 21 yesterday  States they need notes for the compliance visit  I obtained notes from 1/6/2021 visit  James Henley will send in  Compliance report obtained for the last 30 days  Patient was complaining that the pressure is too high  DR Neeru Torres please review compliance  Advised patient will call with Dr Hortencia Day recommendations

## 2021-05-06 NOTE — TELEPHONE ENCOUNTER
Patient called, asking for recommendations  I advised will call when Dr Coral Rowland gets back to us  She states she has been using the new mask, states it worked well for the first 2 nights but then she started having issues with fit and air leaking  I did schedule follow up with Dr Coral Rowland for 6/3/2021 to discuss, but will call if he has any recommendations prior to that appointment

## 2021-05-19 ENCOUNTER — TRANSCRIBE ORDERS (OUTPATIENT)
Dept: ADMINISTRATIVE | Facility: HOSPITAL | Age: 71
End: 2021-05-19

## 2021-05-19 DIAGNOSIS — Z12.31 ENCOUNTER FOR SCREENING MAMMOGRAM FOR MALIGNANT NEOPLASM OF BREAST: Primary | ICD-10-CM

## 2021-05-20 DIAGNOSIS — E03.9 HYPOTHYROIDISM, UNSPECIFIED TYPE: ICD-10-CM

## 2021-05-20 RX ORDER — LEVOTHYROXINE SODIUM 100 MCG
TABLET ORAL
Qty: 90 TABLET | Refills: 0 | Status: SHIPPED | OUTPATIENT
Start: 2021-05-20 | End: 2021-08-16 | Stop reason: SDUPTHER

## 2021-05-26 DIAGNOSIS — G47.33 OSA (OBSTRUCTIVE SLEEP APNEA): Primary | ICD-10-CM

## 2021-05-26 NOTE — TELEPHONE ENCOUNTER
Left message for the patient to call back     Pressure change ordered by Dr Brady Notice emailed to Erin Stanley

## 2021-05-27 ENCOUNTER — TELEPHONE (OUTPATIENT)
Dept: SLEEP CENTER | Facility: CLINIC | Age: 71
End: 2021-05-27

## 2021-05-27 NOTE — TELEPHONE ENCOUNTER
Received a pressure change  Script states both 12/8 and 14/8cm on it  I did not change the pressure due to that  Please write another script with required pressure  Thank you

## 2021-05-29 DIAGNOSIS — G47.33 OSA (OBSTRUCTIVE SLEEP APNEA): Primary | ICD-10-CM

## 2021-06-01 ENCOUNTER — TELEPHONE (OUTPATIENT)
Dept: SLEEP CENTER | Facility: CLINIC | Age: 71
End: 2021-06-01

## 2021-06-03 ENCOUNTER — OFFICE VISIT (OUTPATIENT)
Dept: SLEEP CENTER | Facility: CLINIC | Age: 71
End: 2021-06-03
Payer: MEDICARE

## 2021-06-03 VITALS
SYSTOLIC BLOOD PRESSURE: 130 MMHG | BODY MASS INDEX: 51.7 KG/M2 | DIASTOLIC BLOOD PRESSURE: 80 MMHG | HEIGHT: 56 IN | WEIGHT: 229.8 LBS

## 2021-06-03 DIAGNOSIS — G47.33 OSA (OBSTRUCTIVE SLEEP APNEA): Primary | ICD-10-CM

## 2021-06-03 PROCEDURE — 99213 OFFICE O/P EST LOW 20 MIN: CPT | Performed by: INTERNAL MEDICINE

## 2021-06-03 NOTE — PROGRESS NOTES
Progress Note - Sleep Center   Nicole Condon XDQ:[birth year] MRN: [id number]      Reason for Visit:  73 y  o female here for annual follow-up    Assessment:  Doing well on current therapy of BPAP Auto with a range of 6 to 16 cm for very severe BERTHA (AHI = 100)  Plan:  Although the patient is more comfortable with the current pressure (and her mask is not leaking), she has a high AHI of 11 and feels sometimes that she is not getting enough pressure  Review of her compliance data shows that she generally is having a high setting of 16 cm, which is at her auto limit  I will increase her maximum pressure setting to 20 cm  Follow up: One year    History of Present Illness:  History of BERTHA on PAP therapy  Fully compliant and deriving benefit      Review of Systems      Genitourinary none   Cardiology none   Gastrointestinal none   Neurology frequent headaches, awaken with headache, muscle weakness, numbness/tingling of an extremity, forgetfulness, poor concentration or confusion, , difficulty with memory and balance problems   Constitutional weight change   Integumentary itching   Psychiatry anxiety   Musculoskeletal joint pain, muscle aches, back pain, legs twitching/jerking, sciatica and leg cramps   Pulmonary none   ENT ringing in ears   Endocrine none   Hematological none         I have reviewed and updated the review of systems as necessary      Historical Information    Past Medical History:   Past Medical History:   Diagnosis Date    Depression     History of back surgery     Hyperlipidemia     Hyperthyroidism     Neuropathy          Past Surgical History:   Past Surgical History:   Procedure Laterality Date    BACK SURGERY      KNEE SURGERY      NO PAST SURGERIES      NOSE SURGERY         Social History:   Social History     Socioeconomic History    Marital status: /Civil Union     Spouse name: None    Number of children: None    Years of education: None    Highest education level: None Occupational History    Occupation:  at [address] [place] [address] [email or web address] resource strain: None    Food insecurity     Worry: None     Inability: None    Transportation needs     Medical: None     Non-medical: None   Tobacco Use    Smoking status: Former Smoker    Smokeless tobacco: Former User   Substance and Sexual Activity    Alcohol use: No    Drug use: No    Sexual activity: None   Lifestyle    Physical activity     Days per week: None     Minutes per session: None    Stress: None   Relationships    Social connections     Talks on phone: None     Gets together: None     Attends Taoism service: None     Active member of club or organization: None     Attends meetings of clubs or organizations: None     Relationship status: None    Intimate partner violence     Fear of current or ex partner: None     Emotionally abused: None     Physically abused: None     Forced sexual activity: None   Other Topics Concern    None   Social History Narrative    None       Family History:   Family History   Problem Relation Age of Onset    Diabetes unspecified Mother     Hypertension Mother     Hypertension Sister     Thyroid disease unspecified Sister     Thyroid disease unspecified Brother     Cancer Brother     Cancer Maternal Uncle        Medications/Allergies:      Current Outpatient Medications:     atorvastatin (LIPITOR) 10 mg tablet, Take 1 tablet by mouth daily, Disp: , Rfl:     Cholecalciferol (VITAMIN D3) 2000 units capsule, Take 1 capsule by mouth daily, Disp: , Rfl:     cyanocobalamin (CVS VITAMIN B-12) 1000 MCG tablet, Take 1 tablet by mouth daily, Disp: , Rfl:     desvenlafaxine (PRISTIQ) 100 mg 24 hr tablet, Take 1 tablet by mouth daily, Disp: , Rfl:     furosemide (LASIX) 40 mg tablet, Take 1 tablet by mouth daily, Disp: , Rfl:     pregabalin (LYRICA) 75 mg capsule, Take 1 capsule by mouth 3 (three) times a day  , Disp: , Rfl:     pyridoxine (CVS VITAMIN B-6) 100 MG tablet, Take 1 tablet by mouth daily, Disp: , Rfl:     Synthroid 100 MCG tablet, TAKE 1 TABLET BY MOUTH EVERY DAY, Disp: 90 tablet, Rfl: 0          Objective      Vital Signs:   Vitals:    06/03/21 0923   BP: 130/80     [place] Sleepiness Scale: Total score: 0        Physical Exam:    General: Alert, appropriate, cooperative, overweight    Head: NC/AT    Skin: Warm, dry    Neuro: No motor abnormalities, cranial nerves appear intact    Extremity: No clubbing, cyanosis      DME Provider: [facility]        Counseling / Coordination of Care   I have spent 15 minutes with the patient today in which greater than 50% of this time was spent in counseling/coordination of care regarding: equipment and compliance  Board Certified Sleep Specialist    Portions of the record may have been created with voice recognition software  Occasional wrong word or "sound a like" substitutions may have occurred due to the inherent limitations of voice recognition software  Read the chart carefully and recognize, using context, where substitutions have occurred

## 2021-06-04 ENCOUNTER — TELEPHONE (OUTPATIENT)
Dept: SLEEP CENTER | Facility: CLINIC | Age: 71
End: 2021-06-04

## 2021-06-08 ENCOUNTER — TELEPHONE (OUTPATIENT)
Dept: SLEEP CENTER | Facility: CLINIC | Age: 71
End: 2021-06-08

## 2021-06-08 NOTE — TELEPHONE ENCOUNTER
Patient called, states she is having issues with her mask, and Dr Coral Rowland told her to make an appointment to get a different mask      Scheduled mask fitting 6/9/2021 in [place]

## 2021-06-11 ENCOUNTER — TELEPHONE (OUTPATIENT)
Dept: SLEEP CENTER | Facility: CLINIC | Age: 71
End: 2021-06-11

## 2021-06-11 DIAGNOSIS — G47.33 OSA (OBSTRUCTIVE SLEEP APNEA): Primary | ICD-10-CM

## 2021-06-11 NOTE — TELEPHONE ENCOUNTER
Patient called, states she was supposed to have a mask fitting, but was cancelled because she is not eligible for a new mask  She is asking if her pressure can be decreased states she is unable to keep her mask on  Also she states that she is coughing up clear mucous with bloody streaks  I advised will check to see what her humidity and heated tubing is set at, as this might be contributing to her bloody mucous  She is asking if she could go back to her old machine  I advised that DR Germán Marina is looking at your compliance and feels that this is the right machine for her  I spoke with Thomas Siddiqui from [facility], states she is not eligible for new mask until August   She also advised that humidity and heated tubing both set at 3  She decreased the heated tubing to 1 and humidity to 4  Dr Germán Marina, do you have any recommendations about pressure and patient using her old machine? Please see compliance report attached

## 2021-06-14 ENCOUNTER — TELEPHONE (OUTPATIENT)
Dept: SLEEP CENTER | Facility: CLINIC | Age: 71
End: 2021-06-14

## 2021-06-14 NOTE — TELEPHONE ENCOUNTER
Called the patient advised that Dr Brayan Coto changed her pressure  Patient asking about new mask   Advised she is not eligible for a mask through her insurance until August  Advised she could pay out of pocket from [employer] for pressure change emailed to Erin Stanley

## 2021-06-14 NOTE — TELEPHONE ENCOUNTER
Received a pressure change  Changed accordingly  Patient's now on 16/12cm  Called PT and informed her

## 2021-07-07 ENCOUNTER — HOSPITAL ENCOUNTER (EMERGENCY)
Facility: HOSPITAL | Age: 71
Discharge: HOME | End: 2021-07-07
Attending: EMERGENCY MEDICINE | Admitting: EMERGENCY MEDICINE
Payer: MEDICARE

## 2021-07-07 VITALS
OXYGEN SATURATION: 98 % | DIASTOLIC BLOOD PRESSURE: 103 MMHG | TEMPERATURE: 97.8 F | HEART RATE: 70 BPM | RESPIRATION RATE: 16 BRPM | SYSTOLIC BLOOD PRESSURE: 163 MMHG

## 2021-07-07 DIAGNOSIS — M79.641 RIGHT HAND PAIN: Primary | ICD-10-CM

## 2021-07-07 DIAGNOSIS — M79.644 PAIN OF RIGHT THUMB: ICD-10-CM

## 2021-07-07 DIAGNOSIS — Z86.69 HISTORY OF CARPAL TUNNEL SYNDROME: ICD-10-CM

## 2021-07-07 LAB
ALBUMIN SERPL BCP-MCNC: 3.6 G/DL (ref 3.5–5)
ALP SERPL-CCNC: 102 U/L (ref 46–116)
ALT SERPL W P-5'-P-CCNC: 42 U/L (ref 12–78)
ANION GAP SERPL CALCULATED.3IONS-SCNC: 1 MMOL/L (ref 4–13)
AST SERPL W P-5'-P-CCNC: 32 U/L (ref 5–45)
BASOPHILS # BLD AUTO: 0.04 THOUSANDS/ΜL (ref 0–0.1)
BASOPHILS NFR BLD AUTO: 1 % (ref 0–1)
BILIRUB SERPL-MCNC: 0.26 MG/DL (ref 0.2–1)
BUN SERPL-MCNC: 21 MG/DL (ref 5–25)
CALCIUM SERPL-MCNC: 9.4 MG/DL (ref 8.3–10.1)
CHLORIDE SERPL-SCNC: 107 MMOL/L (ref 100–108)
CK MB SERPL-MCNC: 6 NG/ML (ref 0–5)
CK MB SERPL-MCNC: <1 % (ref 0–2.5)
CK SERPL-CCNC: 704 U/L (ref 26–192)
CO2 SERPL-SCNC: 32 MMOL/L (ref 21–32)
CREAT SERPL-MCNC: 0.83 MG/DL (ref 0.6–1.3)
D DIMER PPP FEU-MCNC: 0.64 UG/ML FEU
EOSINOPHIL # BLD AUTO: 0.27 THOUSAND/ΜL (ref 0–0.61)
EOSINOPHIL NFR BLD AUTO: 4 % (ref 0–6)
ERYTHROCYTE [DISTWIDTH] IN BLOOD BY AUTOMATED COUNT: 13.4 % (ref 11.6–15.1)
GFR SERPL CREATININE-BSD FRML MDRD: 72 ML/MIN/1.73SQ M
GLUCOSE SERPL-MCNC: 132 MG/DL (ref 65–140)
HCT VFR BLD AUTO: 40.8 % (ref 34.8–46.1)
HGB BLD-MCNC: 13 G/DL (ref 11.5–15.4)
IMM GRANULOCYTES # BLD AUTO: 0.02 THOUSAND/UL (ref 0–0.2)
IMM GRANULOCYTES NFR BLD AUTO: 0 % (ref 0–2)
LYMPHOCYTES # BLD AUTO: 1.97 THOUSANDS/ΜL (ref 0.6–4.47)
LYMPHOCYTES NFR BLD AUTO: 27 % (ref 14–44)
MCH RBC QN AUTO: 29 PG (ref 26.8–34.3)
MCHC RBC AUTO-ENTMCNC: 31.9 G/DL (ref 31.4–37.4)
MCV RBC AUTO: 91 FL (ref 82–98)
MONOCYTES # BLD AUTO: 0.91 THOUSAND/ΜL (ref 0.17–1.22)
MONOCYTES NFR BLD AUTO: 12 % (ref 4–12)
NEUTROPHILS # BLD AUTO: 4.11 THOUSANDS/ΜL (ref 1.85–7.62)
NEUTS SEG NFR BLD AUTO: 56 % (ref 43–75)
NRBC BLD AUTO-RTO: 0 /100 WBCS
PLATELET # BLD AUTO: 229 THOUSANDS/UL (ref 149–390)
PMV BLD AUTO: 10.6 FL (ref 8.9–12.7)
POTASSIUM SERPL-SCNC: 3.5 MMOL/L (ref 3.5–5.3)
PROT SERPL-MCNC: 7.7 G/DL (ref 6.4–8.2)
RBC # BLD AUTO: 4.49 MILLION/UL (ref 3.81–5.12)
SODIUM SERPL-SCNC: 140 MMOL/L (ref 136–145)
WBC # BLD AUTO: 7.32 THOUSAND/UL (ref 4.31–10.16)

## 2021-07-07 PROCEDURE — 99284 EMERGENCY DEPT VISIT MOD MDM: CPT

## 2021-07-07 PROCEDURE — 36415 COLL VENOUS BLD VENIPUNCTURE: CPT | Performed by: EMERGENCY MEDICINE

## 2021-07-07 PROCEDURE — 99285 EMERGENCY DEPT VISIT HI MDM: CPT | Performed by: EMERGENCY MEDICINE

## 2021-07-07 PROCEDURE — 85379 FIBRIN DEGRADATION QUANT: CPT | Performed by: EMERGENCY MEDICINE

## 2021-07-07 PROCEDURE — 80053 COMPREHEN METABOLIC PANEL: CPT | Performed by: EMERGENCY MEDICINE

## 2021-07-07 PROCEDURE — 96374 THER/PROPH/DIAG INJ IV PUSH: CPT

## 2021-07-07 PROCEDURE — 82553 CREATINE MB FRACTION: CPT | Performed by: EMERGENCY MEDICINE

## 2021-07-07 PROCEDURE — 82550 ASSAY OF CK (CPK): CPT | Performed by: EMERGENCY MEDICINE

## 2021-07-07 PROCEDURE — 85025 COMPLETE CBC W/AUTO DIFF WBC: CPT | Performed by: EMERGENCY MEDICINE

## 2021-07-07 RX ORDER — IBUPROFEN 400 MG/1
400 TABLET ORAL ONCE
Status: COMPLETED | OUTPATIENT
Start: 2021-07-07 | End: 2021-07-07

## 2021-07-07 RX ORDER — FENTANYL CITRATE 50 UG/ML
50 INJECTION, SOLUTION INTRAMUSCULAR; INTRAVENOUS ONCE
Status: COMPLETED | OUTPATIENT
Start: 2021-07-07 | End: 2021-07-07

## 2021-07-07 RX ORDER — ACETAMINOPHEN 325 MG/1
650 TABLET ORAL ONCE
Status: COMPLETED | OUTPATIENT
Start: 2021-07-07 | End: 2021-07-07

## 2021-07-07 RX ADMIN — IBUPROFEN 400 MG: 400 TABLET ORAL at 02:35

## 2021-07-07 RX ADMIN — ACETAMINOPHEN 650 MG: 325 TABLET, FILM COATED ORAL at 02:35

## 2021-07-07 RX ADMIN — FENTANYL CITRATE 50 MCG: 50 INJECTION INTRAMUSCULAR; INTRAVENOUS at 03:38

## 2021-07-07 NOTE — DISCHARGE INSTRUCTIONS
Please return to the ER if you have any fevers, chills, chest pain, shortness of breath, increased pain or swelling in your arm, discoloration of the arm/hand, inability to feel the hand or move it, or any other new or concerning symptoms  You should call your doctor today to schedule a follow up appointment

## 2021-07-10 NOTE — ED ATTENDING ATTESTATION
7/7/2021  INiurka MD, saw and evaluated the patient  I have discussed the patient with the resident/non-physician practitioner and agree with the resident's/non-physician practitioner's findings, Plan of Care, and MDM as documented in the resident's/non-physician practitioner's note, except where noted  All available labs and Radiology studies were reviewed  I was present for key portions of any procedure(s) performed by the resident/non-physician practitioner and I was immediately available to provide assistance  At this point I agree with the current assessment done in the Emergency Department  I have conducted an independent evaluation of this patient a history and physical is as follows:    ED Course     Patient is a 61-year-old female presents with right hand pain and swelling after overuse injury patient states she was using a wheeze prior repeatedly today in guarding  Denies any direct trauma to hand wrist drop forearm  Patient states there is pain and numbness to her fingers and forearm  Compartments soft neurovascularly intact  Patient does have a history of neuropathy for which he takes medications for  Normal palpable Doppler pulses and cap refill on right upper extremity  Impression right upper extremity pain  Will check screening labs D-dimer pain control place patient splint for comfort  Given patient's presentation at time of day we are unable to obtain expedited duplex ultrasound to exclude for DVT  Patient prescribed a prescription for upper extremity DVT as an outpatient  Patient counseled to return immediately should her symptoms get worse      Critical Care Time  Procedures

## 2021-07-10 NOTE — ED PROVIDER NOTES
History  Chief Complaint   Patient presents with    Arm Pain     pt c/o right arm pain  pt was spraying weeds earlier with that arm     72 yo F with h/o neuropathy, carpal tunnel, presenting for right sided finger pain in all of her fingers but worse in the thumb and second digit  Has never had pain like this before  Doesn't really hurt when she presses on it, does hurt when she tries to move her fingers or wrist  Was outside today spraying weeds with spray so she had been using her right hand extensively  Feels like her fingers may be swollen although when she looks at them they don't look significantly swollen compared to her left hand  Didn't take anything for the pain before coming in  Hasn't noted any swelling or pain in the arm  Doesn't have any significant pain in the wrist  She is concerned it could be a blood clot because she has a h/o LLE DVT but is not currently on any anticoagulation because it had been provoked years ago  No other recent trauma/injury  ROS otherwise neg as below  History provided by:  Patient   used: No    Arm Pain  Associated symptoms: no abdominal pain, no chest pain, no congestion, no cough, no diarrhea, no fatigue, no fever, no headaches, no nausea, no rash, no shortness of breath, no sore throat, no vomiting and no wheezing        Prior to Admission Medications   Prescriptions Last Dose Informant Patient Reported? Taking?    Cholecalciferol (VITAMIN D3) 2000 units capsule  Self Yes No   Sig: Take 1 capsule by mouth daily   Synthroid 100 MCG tablet   No No   Sig: TAKE 1 TABLET BY MOUTH EVERY DAY   atorvastatin (LIPITOR) 10 mg tablet  Self Yes No   Sig: Take 1 tablet by mouth daily   cyanocobalamin (CVS VITAMIN B-12) 1000 MCG tablet  Self Yes No   Sig: Take 1 tablet by mouth daily   desvenlafaxine (PRISTIQ) 100 mg 24 hr tablet  Self Yes No   Sig: Take 1 tablet by mouth daily   furosemide (LASIX) 40 mg tablet  Self Yes No   Sig: Take 1 tablet by mouth daily pregabalin (LYRICA) 75 mg capsule  Self Yes No   Sig: Take 1 capsule by mouth 3 (three) times a day     pyridoxine (CVS VITAMIN B-6) 100 MG tablet  Self Yes No   Sig: Take 1 tablet by mouth daily      Facility-Administered Medications: None       Past Medical History:   Diagnosis Date    Depression     History of back surgery     Hyperlipidemia     Hyperthyroidism     Neuropathy        Past Surgical History:   Procedure Laterality Date    BACK SURGERY      KNEE SURGERY      NO PAST SURGERIES      NOSE SURGERY         Family History   Problem Relation Age of Onset    Diabetes unspecified Mother     Hypertension Mother     Hypertension Sister     Thyroid disease unspecified Sister     Thyroid disease unspecified Brother     Cancer Brother     Cancer Maternal Uncle      I have reviewed and agree with the history as documented  E-Cigarette/Vaping     E-Cigarette/Vaping Substances     Social History     Tobacco Use    Smoking status: Former Smoker    Smokeless tobacco: Former User   Substance Use Topics    Alcohol use: No    Drug use: No        Review of Systems   Constitutional: Negative for chills, fatigue and fever  HENT: Negative for congestion and sore throat  Eyes: Negative for pain and visual disturbance  Respiratory: Negative for cough, shortness of breath and wheezing  Cardiovascular: Negative for chest pain and palpitations  Gastrointestinal: Negative for abdominal pain, diarrhea, nausea and vomiting  Genitourinary: Negative for dysuria and hematuria  Musculoskeletal: Negative for arthralgias and back pain  Skin: Negative for color change and rash  Neurological: Negative for dizziness, seizures, syncope and headaches  Psychiatric/Behavioral: Negative for confusion  The patient is not nervous/anxious  All other systems reviewed and are negative        Physical Exam  ED Triage Vitals   Temperature Pulse Respirations Blood Pressure SpO2   07/07/21 0204 07/07/21 0204 07/07/21 0204 07/07/21 0204 07/07/21 0204   97 8 °F (36 6 °C) 70 16 (!) 163/103 98 %      Temp src Heart Rate Source Patient Position - Orthostatic VS BP Location FiO2 (%)   -- -- -- -- --             Pain Score       07/07/21 0235       7             Orthostatic Vital Signs  Vitals:    07/07/21 0204   BP: (!) 163/103   Pulse: 70       Physical Exam  Vitals and nursing note reviewed  Constitutional:       General: She is not in acute distress  Appearance: Normal appearance  She is well-developed  She is not ill-appearing or diaphoretic  HENT:      Head: Normocephalic and atraumatic  Right Ear: External ear normal       Left Ear: External ear normal       Nose: Nose normal       Mouth/Throat:      Mouth: Mucous membranes are moist       Pharynx: Oropharynx is clear  Eyes:      Conjunctiva/sclera: Conjunctivae normal    Cardiovascular:      Rate and Rhythm: Normal rate and regular rhythm  Heart sounds: No murmur heard  Comments: Palpable radial pulse at the right, doppler signal present at the right ulnar artery  Pulmonary:      Effort: Pulmonary effort is normal  No respiratory distress  Breath sounds: Normal breath sounds  No wheezing or rales  Abdominal:      General: Abdomen is flat  There is no distension  Palpations: Abdomen is soft  Tenderness: There is no abdominal tenderness  There is no guarding  Musculoskeletal:         General: Normal range of motion  Cervical back: Neck supple  Right lower leg: No edema  Left lower leg: No edema  Comments: Right hand without any evidence of swelling or rash  No significant TTP of the right hand or wrist      Soft compartments of the right forearm and hand  Skin:     General: Skin is warm and dry  Comments: No erythema or otherwise cellulitic changes  Neurological:      General: No focal deficit present  Mental Status: She is alert        Comments: Normal sensation of the right upper extremity  Normal strength of the right hand  Able to make the "okay" sign, able to perform thumb opposition  Normal wrist flexion/extension      Psychiatric:         Mood and Affect: Mood normal          ED Medications  Medications   acetaminophen (TYLENOL) tablet 650 mg (650 mg Oral Given 7/7/21 0235)   ibuprofen (MOTRIN) tablet 400 mg (400 mg Oral Given 7/7/21 0235)   fentanyl citrate (PF) 100 MCG/2ML 50 mcg (50 mcg Intravenous Given 7/7/21 0338)       Diagnostic Studies  Results Reviewed     Procedure Component Value Units Date/Time    CKMB [[id number]]  (Abnormal) Collected: 07/07/21 0231    Lab Status: Final result Specimen: Blood from Arm, Left Updated: 07/07/21 0323     CK-MB Index <1 0 %      CK-MB 6 0 ng/mL     D-dimer, quantitative [[id number]]  (Abnormal) Collected: 07/07/21 0231    Lab Status: Final result Specimen: Blood from Arm, Left Updated: 07/07/21 0320     D-Dimer, Quant 0 64 ug/ml FEU     CK (with reflex to MB) [[id number]]  (Abnormal) Collected: 07/07/21 0231    Lab Status: Final result Specimen: Blood from Arm, Left Updated: 07/07/21 0307     Total  U/L     Comprehensive metabolic panel [[id number]]  (Abnormal) Collected: 07/07/21 0231    Lab Status: Final result Specimen: Blood from Arm, Left Updated: 07/07/21 0306     Sodium 140 mmol/L      Potassium 3 5 mmol/L      Chloride 107 mmol/L      CO2 32 mmol/L      ANION GAP 1 mmol/L      BUN 21 mg/dL      Creatinine 0 83 mg/dL      Glucose 132 mg/dL      Calcium 9 4 mg/dL      AST 32 U/L      ALT 42 U/L      Alkaline Phosphatase 102 U/L      Total Protein 7 7 g/dL      Albumin 3 6 g/dL      Total Bilirubin 0 26 mg/dL      eGFR 72 ml/min/1 73sq m     Narrative:      Shaila guidelines for Chronic Kidney Disease (CKD):     Stage 1 with normal or high GFR (GFR > 90 mL/min/1 73 square meters)    Stage 2 Mild CKD (GFR = 60-89 mL/min/1 73 square meters)    Stage 3A Moderate CKD (GFR = 45-59 mL/min/1 73 square meters)    Stage 3B Moderate CKD (GFR = 30-44 mL/min/1 73 square meters)    Stage 4 Severe CKD (GFR = 15-29 mL/min/1 73 square meters)    Stage 5 End Stage CKD (GFR <15 mL/min/1 73 square meters)  Note: GFR calculation is accurate only with a steady state creatinine    CBC and differential [[id number]] Collected: 07/07/21 0231    Lab Status: Final result Specimen: Blood from Arm, Left Updated: 07/07/21 0241     WBC 7 32 Thousand/uL      RBC 4 49 Million/uL      Hemoglobin 13 0 g/dL      Hematocrit 40 8 %      MCV 91 fL      MCH 29 0 pg      MCHC 31 9 g/dL      RDW 13 4 %      MPV 10 6 fL      Platelets 159 Thousands/uL      nRBC 0 /100 WBCs      Neutrophils Relative 56 %      Immat GRANS % 0 %      Lymphocytes Relative 27 %      Monocytes Relative 12 %      Eosinophils Relative 4 %      Basophils Relative 1 %      Neutrophils Absolute 4 11 Thousands/µL      Immature Grans Absolute 0 02 Thousand/uL      Lymphocytes Absolute 1 97 Thousands/µL      Monocytes Absolute 0 91 Thousand/µL      Eosinophils Absolute 0 27 Thousand/µL      Basophils Absolute 0 04 Thousands/µL                  VAS upper limb venous duplex scan, unilateral/limited    (Results Pending)         Procedures  Procedures      ED Course  ED Course as of Jul 10 0751   Wed Jul 07, 2021   0322 Age adjusted d-dimer cutoff 0 69   D-Dimer, Quant(!): 0 64               Identification of Seniors at Risk      Most Recent Value   (ISAR) Identification of Seniors at Risk   Before the illness or injury that brought you to the Emergency, did you need someone to help you on a regular basis? 0 Filed at: 07/07/2021 0205   In the last 24 hours, have you needed more help than usual?  0 Filed at: 07/07/2021 0205   Have you been hospitalized for one or more nights during the past 6 months? 0 Filed at: 07/07/2021 0205   In general, do you see well?  0 Filed at: 07/07/2021 0205   In general, do you have serious problems with your memory?   0 Filed at: 07/07/2021 0205   Do you take more than three different medications every day?  0 Filed at: 07/07/2021 0205   ISAR Score  0 Filed at: 07/07/2021 0205                    SBIRT 20yo+      Most Recent Value   SBIRT (23 yo +)   In order to provide better care to our patients, we are screening all of our patients for alcohol and drug use  Would it be okay to ask you these screening questions? No Filed at: 07/07/2021 0209                [facility]  Number of Diagnoses or Management Options  History of carpal tunnel syndrome  Pain of right thumb  Right hand pain  Diagnosis management comments: 72 yo F presenting for evaluation of right pain in her fingers, mostly in her right thumb and index finger  Did exert herself today using weed killer  No obvious deformity, no evidence of cellulitis or DVT on exam  Has full strength and sensation  Age adjusted d-dimer negative for clot, ordered for outpatient RUE duplex scan given patient's concerns and history and time of day  CK only mildly elevated at 700, likely related to exertion earlier today  Creatinine normal  Compartments soft, no evidence of compartment syndrome, no pain out of proportion  Pulses present, neurovascularly intact  Suspect possible carpal tunnel vs neuropathy given that she describes the pain as somewhat burning in nature and it is in a median nerve distribution  Given wrist splint to use as needed and instructed her to f/u with orthopedics         Disposition  Final diagnoses:   Right hand pain   History of carpal tunnel syndrome   Pain of right thumb     Time reflects when diagnosis was documented in both MDM as applicable and the Disposition within this note     Time User Action Codes Description Comment    7/7/2021  3:35 AM Leanora Ahle Add [B28 808] Right hand pain     7/7/2021  3:36 AM Beto Field Add [Z86 69] History of carpal tunnel syndrome     7/7/2021  3:36 AM Beto Field Add [M79 [id number]] Pain of right thumb       ED Disposition     ED Disposition Condition Date/Time Comment    Discharge Good Wed Jul 7, 2021  3:35 AM Anne Hoffman discharge to home/self care  Follow-up Information     Follow up With Specialties Details Why Contact Info Additional Information    Rakel Delgado, DO Family Medicine Schedule an appointment as soon as possible for a visit in 2 days For reevaluation as we discussed  [address] [facility] [place] [address]  [phone]       [facility] Scheduling Call  to schedule duplex scan [address]  [address] [facility], [address], [address]   [phone]    [address] [facility] Orthopedic Surgery Schedule an appointment as soon as possible for a visit  For reevaluation as we discussed   [address] [facility] [facility], [address] [place] [address], [address] [facility]          Discharge Medication List as of 7/7/2021  3:41 AM      CONTINUE these medications which have NOT CHANGED    Details   atorvastatin (LIPITOR) 10 mg tablet Take 1 tablet by mouth daily, Starting Tue 8/21/2012, Historical Med      Cholecalciferol (VITAMIN D3) 2000 units capsule Take 1 capsule by mouth daily, Historical Med      cyanocobalamin ([employer]S VITAMIN B-12) 1000 MCG tablet Take 1 tablet by mouth daily, Starting Tue 10/21/2014, Historical Med      desvenlafaxine (PRISTIQ) 100 mg 24 hr tablet Take 1 tablet by mouth daily, Historical Med      furosemide (LASIX) 40 mg tablet Take 1 tablet by mouth daily, Starting Sat 4/30/2011, Historical Med      pregabalin (LYRICA) 75 mg capsule Take 1 capsule by mouth 3 (three) times a day  , Historical Med      pyridoxine (CVS VITAMIN B-6) 100 MG tablet Take 1 tablet by mouth daily, Starting Tue 10/21/2014, Historical Med      Synthroid 100 MCG tablet TAKE 1 TABLET BY MOUTH EVERY DAY, Normal Outpatient Discharge Orders   Ambulatory referral to Orthopedic Surgery   Standing Status: Future Standing Exp  Date: 07/07/22      VAS upper limb venous duplex scan, unilateral/limited   Standing Status: Future Standing Exp  Date: 07/07/25       PDMP Review     None           ED Provider  Attending physically available and evaluated Liseth Kofi DUARTE managed the patient along with the ED Attending      Electronically Signed by         Mateusz Guido MD  07/10/21 6376

## 2021-08-16 DIAGNOSIS — E03.9 HYPOTHYROIDISM, UNSPECIFIED TYPE: ICD-10-CM

## 2021-08-16 RX ORDER — LEVOTHYROXINE SODIUM 100 MCG
100 TABLET ORAL DAILY
Qty: 90 TABLET | Refills: 0 | Status: SHIPPED | OUTPATIENT
Start: 2021-08-16 | End: 2021-11-21 | Stop reason: SDUPTHER

## 2021-09-03 ENCOUNTER — HOSPITAL ENCOUNTER (OUTPATIENT)
Dept: RADIOLOGY | Age: 71
Discharge: HOME | End: 2021-09-03
Payer: MEDICARE

## 2021-09-03 VITALS — HEIGHT: 56 IN | BODY MASS INDEX: 51.51 KG/M2 | WEIGHT: 229 LBS

## 2021-09-03 DIAGNOSIS — Z12.31 ENCOUNTER FOR SCREENING MAMMOGRAM FOR MALIGNANT NEOPLASM OF BREAST: ICD-10-CM

## 2021-09-03 PROCEDURE — 77067 SCR MAMMO BI INCL CAD: CPT

## 2021-09-03 PROCEDURE — 77063 BREAST TOMOSYNTHESIS BI: CPT

## 2021-11-21 DIAGNOSIS — E03.9 HYPOTHYROIDISM, UNSPECIFIED TYPE: ICD-10-CM

## 2021-11-21 RX ORDER — LEVOTHYROXINE SODIUM 100 MCG
TABLET ORAL
Qty: 90 TABLET | Refills: 0 | Status: SHIPPED | OUTPATIENT
Start: 2021-11-21 | End: 2022-05-19

## 2022-05-19 DIAGNOSIS — E03.9 HYPOTHYROIDISM, UNSPECIFIED TYPE: ICD-10-CM

## 2022-05-19 RX ORDER — LEVOTHYROXINE SODIUM 100 MCG
TABLET ORAL
Qty: 90 TABLET | Refills: 0 | Status: SHIPPED | OUTPATIENT
Start: 2022-05-19

## 2022-06-07 ENCOUNTER — OFFICE VISIT (OUTPATIENT)
Dept: SLEEP CENTER | Facility: CLINIC | Age: 72
End: 2022-06-07
Payer: COMMERCIAL

## 2022-06-07 VITALS
HEIGHT: 56 IN | OXYGEN SATURATION: 97 % | DIASTOLIC BLOOD PRESSURE: 62 MMHG | HEART RATE: 68 BPM | BODY MASS INDEX: 48.27 KG/M2 | WEIGHT: 214.6 LBS | SYSTOLIC BLOOD PRESSURE: 130 MMHG

## 2022-06-07 DIAGNOSIS — G47.33 OSA (OBSTRUCTIVE SLEEP APNEA): Primary | ICD-10-CM

## 2022-06-07 PROCEDURE — 99213 OFFICE O/P EST LOW 20 MIN: CPT | Performed by: INTERNAL MEDICINE

## 2022-06-07 NOTE — PROGRESS NOTES
Progress Note - Sleep Center   Alexis Khan AIV:[birth year] MRN: [id number]      Reason for Visit:    81 y  o female ***    Assessment:  ***    Plan:  ***    Follow up: One year    History of Present Illness:  Patient is a 62-year-old female with a past medical history of diabetes, hypothyroidism, hypertension, lumbar radiculopathy and obstructive sleep apnea present for on wall follow-up  ***    I have reviewed and updated the review of systems as necessary     Historical Information    Past Medical History:   Diagnosis Date    Depression     History of back surgery     Hyperlipidemia     Hyperthyroidism     Neuropathy          Past Surgical History:   Procedure Laterality Date    BACK SURGERY      BREAST BIOPSY      left?     KNEE SURGERY      NO PAST SURGERIES      NOSE SURGERY           Social History     Socioeconomic History    Marital status: /Civil Union     Spouse name: Not on file    Number of children: Not on file    Years of education: Not on file    Highest education level: Not on file   Occupational History    Occupation:  at [employer] Smoking status: Former Smoker    Smokeless tobacco: Former User   Substance and Sexual Activity    Alcohol use: No    Drug use: No    Sexual activity: Not on file   Other Topics Concern    Not on file   Social History Narrative    Not on file     Social Determinants of Health     Financial Resource Strain: Not on file   Food Insecurity: Not on file   Transportation Needs: Not on file   Physical Activity: Not on file   Stress: Not on file   Social Connections: Not on file   Intimate Partner Violence: Not on file   Housing Stability: Not on file           History   Alcohol use: Not on file       History   Smoking Status    Not on file   Smokeless Tobacco    Not on file       Family History:   Family History   Problem Relation Age of Onset    Diabetes unspecified Mother     Hypertension Mother     Thyroid disease Verified Results  PAP WITH HIGH RISK HPV REFLEX [id number] 12:01AM LAN VAUGHN   [Mar 3, 2018 12:49PM LAN VAUGHN]  normal card     Test Name Result Flag Reference   PAP WITH HIGH RISK HPV REFLEX (Report) O    Name: JOSE ARMANDO HUERTAS      MRN:   [id number]   : 1959           Visit#: [id number]                Gynecologic Cytology Consultation Report      [facility]      Date Specimen Collected: 18      Accession #: [id number]   Date Specimen Received: 18      Requisition   #:[id number]   Date Reported:      3/2/2018 13:45  Location:   [facility]      ______________________________________________________________________________   Cytologic Interpretation :      Negative for intraepithelial lesion or malignancy.   Atrophic cellular changes.       Satisfactory for evaluation. Absence of endocervical/transformation zone   component.         ELIZABETH Jon(ASCP)   ** Electronic Signature (NLB) 3/2/2018  13:45 **      Educational note: The Pap test is a screening test with a well-recognized   false negative rate. The best means available to lower the false negative   rate and to detect early cervical lesions is a Pap test at regular intervals.    All ThinPrep Paps will be reviewed with the aid of the ThinPrep Imaging   System, unless otherwise specified.      ______________________________________________________________________________   Clinical Information:   Menstrual Hx: Post Menopausal   Other Clinical Conditions:Pap source: Cervical   REASON FOR COLLECTION::DIAGNOSTIC: (SEE COMMENTS)   SOURCE::CERVICAL      Specimen(s) Submitted:    PAP with HPV Reflex (ThinPrep only)      ICD Codes:    Z01.419      Fee Codes:    A: [id number]      Performing Lab Location (Unless otherwise specified):   [facility]   [address] [place] [address]. [phone]        unspecified Brother     Cancer Brother         intestinal    Cancer Maternal Uncle         intestinal    No Known Problems Father        Medications/Allergies:      Current Outpatient Medications:     atorvastatin (LIPITOR) 10 mg tablet, Take 1 tablet by mouth daily, Disp: , Rfl:     Cholecalciferol (VITAMIN D3) 2000 units capsule, Take 1 capsule by mouth daily, Disp: , Rfl:     cyanocobalamin (VITAMIN B-12) 1000 MCG tablet, Take 1 tablet by mouth daily, Disp: , Rfl:     desvenlafaxine (PRISTIQ) 100 mg 24 hr tablet, Take 1 tablet by mouth daily, Disp: , Rfl:     pregabalin (LYRICA) 75 mg capsule, Take 1 capsule by mouth 3 (three) times a day  , Disp: , Rfl:     pyridoxine (B-6) 100 MG tablet, Take 1 tablet by mouth daily, Disp: , Rfl:     Synthroid 100 MCG tablet, TAKE 1 TABLET BY MOUTH EVERY DAY, Disp: 90 tablet, Rfl: 0    furosemide (LASIX) 40 mg tablet, Take 1 tablet by mouth daily (Patient not taking: Reported on 6/7/2022), Disp: , Rfl:       Objective    Vital Signs:   Vitals:    06/07/22 1000   BP: 130/62   Pulse: 68   SpO2: 97%   Weight: 97 3 kg (214 lb 9 6 oz)   Height: 4' 8" (1 422 m)     [place] Sleepiness Scale:  Total score: 1    Physical Exam:    General: Alert, appropriate, cooperative, overweight    Head: NC/AT    Skin: Warm, dry    Neuro: No motor abnormalities, cranial nerves appear intact    Psych: Normal affect            Stan Zamorano PGY3  Internal Medicine residency

## 2022-06-07 NOTE — PROGRESS NOTES
Progress Note - Sleep Center   Mis Hazel HTB:[birth year] MRN: [id number]      Reason for Visit:  81 y  o female here for annual follow-up    Assessment:  Doing well on current therapy of BPAP Auto with minimum EPAP 12 cm, maximum IPAP 20 cm and pressure support 6 for previously diagnosed severe obstructive sleep apnea (AHI = 100)  I have been titrating the pressure on her device, but have not found one that eliminates all respiratory events and snoring  I have lowered her maximum inspiratory pressure to 18 cm from 20 cm  High pressure has been causing leak and discomfort  Plan:  Pressure change as above  Follow up: One year    History of Present Illness:  History of BERTHA on PAP therapy  Fully compliant and deriving benefit  Review of Systems      Genitourinary need to urinate more than twice a night and hot flashes at night   Cardiology ankle/leg swelling   Gastrointestinal abdominal pain or cramping that disturb sleep    Neurology muscle weakness, numbness/tingling of an extremity, forgetfulness, poor concentration or confusion, , difficulty with memory and balance problems   Constitutional weight change   Integumentary itching   Psychiatry mood change   Musculoskeletal joint pain, muscle aches, back pain, legs twitching/jerking and leg cramps   Pulmonary snoring and difficulty breathing when lying flat    ENT throat clearing and ringing in ears   Endocrine excessive thirst and frequent urination   Hematological none         I have reviewed and updated the review of systems as necessary      Historical Information    Past Medical History:   Past Medical History:   Diagnosis Date    Depression     History of back surgery     Hyperlipidemia     Hyperthyroidism     Neuropathy          Past Surgical History:   Past Surgical History:   Procedure Laterality Date    BACK SURGERY      BREAST BIOPSY      left?     KNEE SURGERY      NO PAST SURGERIES      NOSE SURGERY         Social History:   Social History     Socioeconomic History    Marital status: /Civil Union     Spouse name: Not on file    Number of children: Not on file    Years of education: Not on file    Highest education level: Not on file   Occupational History    Occupation:  at [employer] Smoking status: Former Smoker    Smokeless tobacco: Former User   Substance and Sexual Activity    Alcohol use: No    Drug use: No    Sexual activity: Not on file   Other Topics Concern    Not on file   Social History Narrative    Not on file     Social Determinants of Health     Financial Resource Strain: Not on file   Food Insecurity: Not on file   Transportation Needs: Not on file   Physical Activity: Not on file   Stress: Not on file   Social Connections: Not on file   Intimate Partner Violence: Not on file   Housing Stability: Not on file       Family History:   Family History   Problem Relation Age of Onset    Diabetes unspecified Mother     Hypertension Mother     Thyroid disease unspecified Brother     Cancer Brother         intestinal    Cancer Maternal Uncle         intestinal    No Known Problems Father        Medications/Allergies:      Current Outpatient Medications:     atorvastatin (LIPITOR) 10 mg tablet, Take 1 tablet by mouth daily, Disp: , Rfl:     Cholecalciferol (VITAMIN D3) 2000 units capsule, Take 1 capsule by mouth daily, Disp: , Rfl:     cyanocobalamin (VITAMIN B-12) 1000 MCG tablet, Take 1 tablet by mouth daily, Disp: , Rfl:     desvenlafaxine (PRISTIQ) 100 mg 24 hr tablet, Take 1 tablet by mouth daily, Disp: , Rfl:     pregabalin (LYRICA) 75 mg capsule, Take 1 capsule by mouth 3 (three) times a day  , Disp: , Rfl:     pyridoxine (B-6) 100 MG tablet, Take 1 tablet by mouth daily, Disp: , Rfl:     Synthroid 100 MCG tablet, TAKE 1 TABLET BY MOUTH EVERY DAY, Disp: 90 tablet, Rfl: 0    furosemide (LASIX) 40 mg tablet, Take 1 tablet by mouth daily (Patient not taking: Reported on 6/7/2022), Disp: , Rfl:           Objective      Vital Signs:   Vitals:    06/07/22 1000   BP: 130/62   Pulse: 68   SpO2: 97%     [place] Sleepiness Scale: Total score: 1        Physical Exam:    General: Alert, appropriate, cooperative, overweight    Head: NC/AT    Skin: Warm, dry    Neuro: No motor abnormalities, cranial nerves appear intact    Extremity: No clubbing, cyanosis      DME Provider: [facility]        Counseling / Coordination of Care   I have spent 15 minutes with the patient today in which greater than 50% of this time was spent in counseling/coordination of care regarding: equipment and compliance  Board Certified Sleep Specialist    Portions of the record may have been created with voice recognition software  Occasional wrong word or "sound a like" substitutions may have occurred due to the inherent limitations of voice recognition software  Read the chart carefully and recognize, using context, where substitutions have occurred

## 2022-06-08 ENCOUNTER — TELEPHONE (OUTPATIENT)
Dept: SLEEP CENTER | Facility: CLINIC | Age: 72
End: 2022-06-08

## 2022-06-08 LAB
DME PARACHUTE DELIVERY DATE REQUESTED: NORMAL
DME PARACHUTE ITEM DESCRIPTION: NORMAL
DME PARACHUTE ORDER STATUS: NORMAL
DME PARACHUTE SUPPLIER NAME: NORMAL
DME PARACHUTE SUPPLIER PHONE: NORMAL

## 2022-06-10 ENCOUNTER — TELEPHONE (OUTPATIENT)
Dept: SLEEP CENTER | Facility: CLINIC | Age: 72
End: 2022-06-10

## 2022-06-10 NOTE — TELEPHONE ENCOUNTER
I called patient to inform her that she's not eligible at this time for a new mask and wanted to inform her before her mask fit appointment with me on Monday  Patient did cancel  BUT she stated she would like her pressures to go back to what they were prior before the most recent pressure change  Is that an option?

## 2022-06-21 NOTE — TELEPHONE ENCOUNTER
Dr Cassidy Vaughn, please see Gris's note  Patient asking if pressure can be set back to what it was prior to most recent pressure change  Please write for pressure change if agreeable

## 2022-06-22 NOTE — TELEPHONE ENCOUNTER
According to your office note from 6/7/22, patient on auto BiPAP with minimum EPAP 12 cm, maximum IPAP 20 cm and pressure support 6  However the compliance data that was printed for that visit(date range 5/3/22-6/1/22) shows her settings auto BiPAP, minimum EPAP 12cm, maximum IPAP 18cm, maximum pressure support 6  The pressure change prior to that ordered 6/11/21 was standard BiPAP:  Machine Type: BiPAP   Machine Type: Standard   Inspiratory Pressure 16   Expiratory Pressure 12   Positive Airway Pressure Range 16/12       Please advise how you'd like to proceed

## 2022-06-29 DIAGNOSIS — G47.33 OSA (OBSTRUCTIVE SLEEP APNEA): Primary | ICD-10-CM

## 2022-06-30 NOTE — TELEPHONE ENCOUNTER
Spoke with patient to advise that Dr Hank Potts did write Rx for pressure change to return to original setting of 16/12  Pt verbalized understanding, was provided with the nurse line to call back with any further questions  Rx for pressure change sent to [facility] via [place]

## 2022-07-07 ENCOUNTER — TELEPHONE (OUTPATIENT)
Dept: SLEEP CENTER | Facility: CLINIC | Age: 72
End: 2022-07-07

## 2022-07-07 NOTE — TELEPHONE ENCOUNTER
Patient left message stating she is not satisfied with the pressure change that was made  It's too low  Requested call back to explain  Returned patient's call and left call back message

## 2022-07-08 NOTE — TELEPHONE ENCOUNTER
It was put into your bin  Please make sure to check  it regularly so we can make sure you're receiving these the first time I print  It's now upfront

## 2022-07-08 NOTE — TELEPHONE ENCOUNTER
Dr Neeru Torres, please review compliance and advise  Patient feels pressure is too low after recent pressure change

## 2022-07-11 NOTE — TELEPHONE ENCOUNTER
Spoke to Og who checked setting on BiPAP  She confirmed machine is set yo 16/12cm  It shows as 16/10cm on compliance report due to the pressure support settings  Called patient and advised her of above  Offered to kobe the call to [facility] to explain the pressure settings to her but patient declined  States she will keep pressures the way it is now  If she changes her mind she will call back

## 2022-07-11 NOTE — TELEPHONE ENCOUNTER
Returned patient's call  Patient states she has been waiting 3 weeks for her machine pressure to be changed  Advised patient pressure change 16/12cm was ordered on 7/6/22  Per compliance data printed 7/8/22 her machine is at 16/10cm  I am not sure why it was not changed to the ordered setting  Per patient her machine settings are showing as 14/10cm and that is too low  She wants machine changed to 16/12cm  Raudel Trujillo, can you please confirm the patient's current BiPAP settings?

## 2023-05-18 ENCOUNTER — TELEPHONE (OUTPATIENT)
Dept: SLEEP CENTER | Facility: CLINIC | Age: 73
End: 2023-05-18

## 2023-05-18 NOTE — TELEPHONE ENCOUNTER
Returned the patient's call   She has  multiple issues  She is reporting that she feels  her pressure is too high and also that her mask is not fitting,  she has the straps so tight it hurts her face  She also reports [facility] is sending her the wrong masks  Scheduled her to come for a mask fitting, to check if she is getting the correct masks   Also requested compliance from her machine for Dr Ophelia Silva to review and changed her compliance appointment with Dr Ophelia Silva from October to 6/7/2023

## 2023-05-18 NOTE — TELEPHONE ENCOUNTER
Pt  Came to [facility] b/c her pressure felt too high and her mask was wrong  She needed FOR HER headgear, I had one to give her  Also her pressure was set way too high, it came frm her switching to the new replacement from [place]  The pressure was at 18/10cm PS 4-8cm this is incorrect  I changed her pressure to 16/12cm and made sure he modem calls in  Issues resolved

## 2023-05-31 ENCOUNTER — TELEPHONE (OUTPATIENT)
Dept: SLEEP CENTER | Facility: CLINIC | Age: 73
End: 2023-05-31

## 2023-05-31 NOTE — TELEPHONE ENCOUNTER
"I SPOKE W/ PT & INFORMED HER THAT I SPOKE W/ TREY CRUZ @ INSURANCE COMPANY RE: HER [facility] [facility] \"HMO\" PLAN  PER TREY CRUZ, [facility] / SLEEP MED ARE OUT OF NETWORK W/ THAT PLAN  REF #: [id number]  PATIENT STATES SHE WANTS TO CALL THE INSURANCE COMP AND VERIFY ABOVE BEFORE CANCELLING SLEEP MED APPT    PT CALLED BACK INFORMING ME SHE WANTS TO KEEP APPT  SHE DID SPEAK W/ INSUR COMP      "

## 2023-06-02 ENCOUNTER — NEW PATIENT (OUTPATIENT)
Dept: URBAN - METROPOLITAN AREA CLINIC 6 | Facility: CLINIC | Age: 73
End: 2023-06-02

## 2023-06-02 DIAGNOSIS — H43.813: ICD-10-CM

## 2023-06-02 DIAGNOSIS — E11.9: ICD-10-CM

## 2023-06-02 DIAGNOSIS — H25.813: ICD-10-CM

## 2023-06-02 DIAGNOSIS — H35.413: ICD-10-CM

## 2023-06-02 DIAGNOSIS — H35.371: ICD-10-CM

## 2023-06-02 PROCEDURE — 92015 DETERMINE REFRACTIVE STATE: CPT | Mod: NC

## 2023-06-02 PROCEDURE — 99204 OFFICE O/P NEW MOD 45 MIN: CPT

## 2023-06-02 PROCEDURE — 92250 FUNDUS PHOTOGRAPHY W/I&R: CPT

## 2023-06-02 ASSESSMENT — VISUAL ACUITY
OD_CC: J16
OD_PH: 20/80+2
OS_CC: 20/30-1
OS_CC: J3

## 2023-06-02 ASSESSMENT — TONOMETRY
OS_IOP_MMHG: 18
OD_IOP_MMHG: 19

## 2023-06-07 ENCOUNTER — OFFICE VISIT (OUTPATIENT)
Dept: SLEEP CENTER | Facility: CLINIC | Age: 73
End: 2023-06-07
Payer: COMMERCIAL

## 2023-06-07 VITALS — DIASTOLIC BLOOD PRESSURE: 78 MMHG | SYSTOLIC BLOOD PRESSURE: 128 MMHG | WEIGHT: 220 LBS | BODY MASS INDEX: 49.32 KG/M2

## 2023-06-07 DIAGNOSIS — G47.33 OSA (OBSTRUCTIVE SLEEP APNEA): Primary | ICD-10-CM

## 2023-06-07 DIAGNOSIS — E66.9 OBESITY (BMI 30-39.9): ICD-10-CM

## 2023-06-07 DIAGNOSIS — R68.2 DRY MOUTH: ICD-10-CM

## 2023-06-07 DIAGNOSIS — F32.A DEPRESSION, UNSPECIFIED DEPRESSION TYPE: ICD-10-CM

## 2023-06-07 DIAGNOSIS — I10 HYPERTENSION, UNSPECIFIED TYPE: ICD-10-CM

## 2023-06-07 DIAGNOSIS — M54.12 CERVICAL RADICULOPATHY: ICD-10-CM

## 2023-06-07 PROCEDURE — 99214 OFFICE O/P EST MOD 30 MIN: CPT | Performed by: INTERNAL MEDICINE

## 2023-06-07 NOTE — PATIENT INSTRUCTIONS
Strategies to improve dry mouth were discussed  Specifically, adequate treatment of nasal allergies, adjusting heat and humidity settings on PAP machine, using Biotene mouthwash &/or Xylimelt  Nursing Support:  When: Monday through Friday 7A-5PM except holidays  Where: Our direct line is [phone]  If you are having a true emergency please call 911  In the event that the line is busy or it is after hours please leave a voice message and we will return your call  Please speak clearly, leaving your full name, birth date, best number to reach you and the reason for your call  Medication refills: We will need the name of the medication, the dosage, the ordering provider, whether you get a 30 or 90 day refill, and the pharmacy name and address  Medications will be ordered by the provider only  Nurses cannot call in prescriptions  Please allow 7 days for medication refills  Physician requested updates: If your provider requested that you call with an update after starting medication, please be ready to provide us the medication and dosage, what time you take your medication, the time you attempt to fall asleep, time you fall asleep, when you wake up, and what time you get out of bed  Sleep Study Results: We will contact you with sleep study results and/or next steps after the physician has reviewed your testing

## 2023-06-07 NOTE — PROGRESS NOTES
Follow-Up Note - Sleep Center   Deepak Morocho  72 y o  female  PGR:[birth year]  OJF:[id number]  DOS:6/7/2023    CC: I saw this patient for follow-up in clinic today for Sleep disordered breathing, Coexisting Sleep and Medical Problems  Interval changes: Patient received ot a refurbished dream Station Version 1 machine from [employer] several months ago  Titration study in 2020 demonstrated she needed a pressure of 24/20 cm H2O to remediate sleep disordered breathing  BiPAP auto was initiated  There were no results of prior diagnostic studies  PFSH, Problem List, Medications & Allergies were reviewed in EMR  She  has a past medical history of Depression, History of back surgery, Hyperlipidemia, Hyperthyroidism, and Neuropathy  She has a current medication list which includes the following prescription(s): atorvastatin, vitamin d3, cyanocobalamin, desvenlafaxine, pregabalin, pyridoxine, synthroid, and furosemide  PHYSIOLOGICAL DATA REVIEW : Using PAP > 4 hours/night 100%  Estimated BRODY 6 8/hour with pressure of 18/13cm [email or web address] com percentile; patient has not been using non FDA approved devices to sanitize the machine  INTERPRETATION: Compliance is excellent; Pressure setting is:in acceptable range; ;   SUBJECTIVE: With respect to use of PAP, Shalonda Levine  is experiencing significant adverse effects:dry mouth/throat and dry nose  She derives benefit  Is satisfied with sleep and daytime function  Sleep Routine: Shalonda Levine reports getting 9 hrs sleep; she has no difficulty initiating or maintaining sleep   She arises requiring alarms &/or someone to arouse and is not always refreshed  Shalonda Levine denies excessive daytime sleepiness, or napping  She rated [herself] at Total score: 1 /24 on the [place] Sleepiness Scale  Other issues: None reported  Habits:[ reports that she has quit smoking   She has quit using smokeless tobacco ], [ reports no history of alcohol use ], [ reports no history of drug use ], Caffeine use:limited; Exercise routine: regular  ROS: Significant for weight has been fluctuating in the range of a few pounds  A 10 point review of systems was otherwise negative  Pain control and mood are stable on current medications  George Moore EXAM: /78   Wt 99 8 kg (220 lb)   BMI 49 32 kg/m²     Wt Readings from Last 3 Encounters:   06/07/23 99 8 kg (220 lb)   06/07/22 97 3 kg (214 lb 9 6 oz)   09/03/21 104 kg (229 lb)      Patient is well groomed; well appearing  CNS: Alert, orientated, clear & coherent speech  Psych: cooperative and in no distress  Mental state: Appears normal   H&N: EOMI; NC/AT: No facial pressure marks, no rashes  Skin/Extrem: col & hydration normal; no edema  Resp: Respiratory effort is normal  Physical findings otherwise essentially unchanged from previous  IMPRESSION: Problem List Items & Comorbidities Addressed this Visit    1  BERTHA (obstructive sleep apnea)  PAP DME Resupply/Reorder      2  Dry mouth        3  Hypertension, unspecified type        4  Depression, unspecified depression type        5  Cervical radiculopathy        6  Obesity (BMI 30-39  9)            PLAN:  1  I reviewed results of prior studies and physiologic data with the patient  2  I discussed treatment options with risks and benefits  3  Treatment with  PAP is medically necessary and Jaime Buff is agreable to continue use  4  Care of equipment, methods to improve comfort using PAP and importance of compliance with therapy were discussed  5  Pressure setting: continue BiPAP auto   6  Rx provided to replace supplies and Care coordinated with DME provider  7  Strategies to improve dry mouth were discussed  Specifically, adequate treatment of nasal allergies, adjusting heat and humidity settings on PAP machine, using Biotene mouthwash &/or Xylimelt  8  Discussed strategies for weight reduction  9  Follow-up is advised in 1 year or sooner if needed to monitor progress, compliance and to adjust therapy  "    Thank you for allowing me to participate in the care of this patient  Sincerely,     Authenticated electronically on 80/48/16   Board Certified Specialist     Portions of the record may have been created with voice recognition software  Occasional wrong word or \"sound a like\" substitutions may have occurred due to the inherent limitations of voice recognition software  There may also be notations and random deletions of words or characters from malfunctioning software  Read the chart carefully and recognize, using context, where substitutions/deletions have occurred      "

## 2023-06-08 ENCOUNTER — TELEPHONE (OUTPATIENT)
Dept: SLEEP CENTER | Facility: CLINIC | Age: 73
End: 2023-06-08

## 2023-06-08 NOTE — TELEPHONE ENCOUNTER
Rx for PAP supplies sent to [facility] via [place]  Spoke with patients son Rd Cotto who states that he will be able to  patient at discharge.       Dora Townsend RN  07/22/21 [id number]

## 2023-06-09 LAB

## 2023-07-27 ENCOUNTER — PRE-OP CATARACT MEASUREMENTS (OUTPATIENT)
Dept: URBAN - METROPOLITAN AREA CLINIC 6 | Facility: CLINIC | Age: 73
End: 2023-07-27

## 2023-07-27 DIAGNOSIS — H25.813: ICD-10-CM

## 2023-07-27 DIAGNOSIS — H35.371: ICD-10-CM

## 2023-07-27 DIAGNOSIS — E11.9: ICD-10-CM

## 2023-07-27 DIAGNOSIS — H35.413: ICD-10-CM

## 2023-07-27 DIAGNOSIS — H43.813: ICD-10-CM

## 2023-07-27 PROCEDURE — 92134 CPTRZ OPH DX IMG PST SGM RTA: CPT

## 2023-07-27 PROCEDURE — 92012 INTRM OPH EXAM EST PATIENT: CPT

## 2023-07-27 PROCEDURE — 92136 OPHTHALMIC BIOMETRY: CPT

## 2023-07-27 ASSESSMENT — KERATOMETRY
OS_K2POWER_DIOPTERS: 45.75
OS_AXISANGLE_DEGREES: 73
OS_AXISANGLE2_DEGREES: 163
OD_K1POWER_DIOPTERS: 44.50
OS_K1POWER_DIOPTERS: 44.50
OD_AXISANGLE2_DEGREES: 14
OD_AXISANGLE_DEGREES: 104
OD_K2POWER_DIOPTERS: 45.25

## 2023-07-27 ASSESSMENT — TONOMETRY
OS_IOP_MMHG: 14
OD_IOP_MMHG: 17

## 2023-07-27 ASSESSMENT — VISUAL ACUITY
OD_PH: 20/200
OS_GLARE: 20/400
OD_CC: 20/400
OS_PH: 20/40
OS_CC: 20/40

## 2023-08-14 ENCOUNTER — SURGERY/PROCEDURE (OUTPATIENT)
Dept: URBAN - METROPOLITAN AREA SURGICAL CENTER 6 | Facility: SURGICAL CENTER | Age: 73
End: 2023-08-14

## 2023-08-14 DIAGNOSIS — H25.811: ICD-10-CM

## 2023-08-14 PROCEDURE — 66984 XCAPSL CTRC RMVL W/O ECP: CPT

## 2023-08-15 ENCOUNTER — 1 DAY POST-OP (OUTPATIENT)
Dept: URBAN - METROPOLITAN AREA CLINIC 6 | Facility: CLINIC | Age: 73
End: 2023-08-15

## 2023-08-15 DIAGNOSIS — H25.812: ICD-10-CM

## 2023-08-15 DIAGNOSIS — H35.371: ICD-10-CM

## 2023-08-15 DIAGNOSIS — Z96.1: ICD-10-CM

## 2023-08-15 PROCEDURE — 99024 POSTOP FOLLOW-UP VISIT: CPT | Mod: 26

## 2023-08-15 PROCEDURE — 92136 OPHTHALMIC BIOMETRY: CPT | Mod: 26,LT

## 2023-08-15 ASSESSMENT — KERATOMETRY
OD_K1POWER_DIOPTERS: 44.50
OS_AXISANGLE2_DEGREES: 163
OD_K2POWER_DIOPTERS: 45.25
OD_AXISANGLE_DEGREES: 104
OS_K2POWER_DIOPTERS: 45.75
OS_K1POWER_DIOPTERS: 44.50
OD_AXISANGLE2_DEGREES: 14
OS_AXISANGLE_DEGREES: 73

## 2023-08-15 ASSESSMENT — VISUAL ACUITY
OS_GLARE: 20/400
OS_CC: 20/40
OD_SC: 20/60

## 2023-08-15 ASSESSMENT — TONOMETRY
OS_IOP_MMHG: 17
OD_IOP_MMHG: 23

## 2023-08-16 ASSESSMENT — KERATOMETRY
OS_K2POWER_DIOPTERS: 45.75
OS_AXISANGLE_DEGREES: 73
OS_K1POWER_DIOPTERS: 44.50
OD_K1POWER_DIOPTERS: 44.50
OD_AXISANGLE2_DEGREES: 14
OD_AXISANGLE_DEGREES: 104
OS_AXISANGLE2_DEGREES: 163
OD_K2POWER_DIOPTERS: 45.25

## 2023-08-22 ENCOUNTER — 1 WEEK POST-OP (OUTPATIENT)
Dept: URBAN - METROPOLITAN AREA CLINIC 6 | Facility: CLINIC | Age: 73
End: 2023-08-22

## 2023-08-22 DIAGNOSIS — H25.812: ICD-10-CM

## 2023-08-22 DIAGNOSIS — Z96.1: ICD-10-CM

## 2023-08-22 PROCEDURE — 99024 POSTOP FOLLOW-UP VISIT: CPT

## 2023-08-22 ASSESSMENT — TONOMETRY
OD_IOP_MMHG: 15
OS_IOP_MMHG: 13

## 2023-08-22 ASSESSMENT — KERATOMETRY
OD_K2POWER_DIOPTERS: 45.25
OS_K1POWER_DIOPTERS: 44.50
OD_AXISANGLE_DEGREES: 104
OS_AXISANGLE2_DEGREES: 163
OD_AXISANGLE2_DEGREES: 14
OD_K1POWER_DIOPTERS: 44.50
OS_AXISANGLE_DEGREES: 73
OS_K2POWER_DIOPTERS: 45.75

## 2023-08-22 ASSESSMENT — VISUAL ACUITY
OD_SC: 20/30+1
OS_SC: 20/200
OS_PH: 20/70
OD_OTHER: 1 WEEK POST OP
OD_PH: 20/30

## 2023-08-28 ENCOUNTER — SURGERY/PROCEDURE (OUTPATIENT)
Dept: URBAN - METROPOLITAN AREA SURGICAL CENTER 6 | Facility: SURGICAL CENTER | Age: 73
End: 2023-08-28

## 2023-08-28 DIAGNOSIS — H25.812: ICD-10-CM

## 2023-08-28 PROCEDURE — 66984 XCAPSL CTRC RMVL W/O ECP: CPT | Mod: 79,LT

## 2023-08-29 ENCOUNTER — 1 DAY POST-OP (OUTPATIENT)
Dept: URBAN - METROPOLITAN AREA CLINIC 6 | Facility: CLINIC | Age: 73
End: 2023-08-29

## 2023-08-29 DIAGNOSIS — H35.413: ICD-10-CM

## 2023-08-29 DIAGNOSIS — Z96.1: ICD-10-CM

## 2023-08-29 PROCEDURE — 99024 POSTOP FOLLOW-UP VISIT: CPT

## 2023-08-29 ASSESSMENT — KERATOMETRY
OS_K2POWER_DIOPTERS: 45.75
OS_K2POWER_DIOPTERS: 45.75
OS_K1POWER_DIOPTERS: 44.50
OD_K1POWER_DIOPTERS: 44.50
OD_AXISANGLE_DEGREES: 104
OS_AXISANGLE_DEGREES: 73
OS_AXISANGLE_DEGREES: 73
OD_K2POWER_DIOPTERS: 45.25
OD_K2POWER_DIOPTERS: 45.25
OD_AXISANGLE2_DEGREES: 14
OD_K1POWER_DIOPTERS: 44.50
OS_K1POWER_DIOPTERS: 44.50
OD_AXISANGLE2_DEGREES: 14
OS_AXISANGLE2_DEGREES: 163
OD_AXISANGLE_DEGREES: 104
OS_AXISANGLE2_DEGREES: 163

## 2023-08-29 ASSESSMENT — TONOMETRY
OS_IOP_MMHG: 18
OD_IOP_MMHG: 16

## 2023-08-29 ASSESSMENT — VISUAL ACUITY
OD_PH: 20/20
OS_SC: 20/25
OD_SC: 20/25-1
OS_PH: 20/25+1

## 2023-09-05 ENCOUNTER — 1 WEEK POST-OP (OUTPATIENT)
Dept: URBAN - METROPOLITAN AREA CLINIC 6 | Facility: CLINIC | Age: 73
End: 2023-09-05

## 2023-09-05 DIAGNOSIS — Z96.1: ICD-10-CM

## 2023-09-05 DIAGNOSIS — H35.413: ICD-10-CM

## 2023-09-05 PROCEDURE — 99024 POSTOP FOLLOW-UP VISIT: CPT

## 2023-09-05 ASSESSMENT — KERATOMETRY
OS_AXISANGLE2_DEGREES: 163
OS_K1POWER_DIOPTERS: 44.50
OD_K2POWER_DIOPTERS: 45.25
OS_K2POWER_DIOPTERS: 45.75
OD_AXISANGLE2_DEGREES: 14
OD_AXISANGLE_DEGREES: 104
OD_K1POWER_DIOPTERS: 44.50
OS_AXISANGLE_DEGREES: 73

## 2023-09-05 ASSESSMENT — TONOMETRY
OS_IOP_MMHG: 15
OD_IOP_MMHG: 17

## 2023-09-05 ASSESSMENT — VISUAL ACUITY
OD_SC: 20/20-1
OS_SC: 20/25+2
OU_SC: J5

## 2023-10-09 ENCOUNTER — POST-OP CHECK (OUTPATIENT)
Dept: URBAN - METROPOLITAN AREA CLINIC 6 | Facility: CLINIC | Age: 73
End: 2023-10-09

## 2023-10-09 DIAGNOSIS — Z96.1: ICD-10-CM

## 2023-10-09 DIAGNOSIS — H35.413: ICD-10-CM

## 2023-10-09 PROCEDURE — 92015 DETERMINE REFRACTIVE STATE: CPT

## 2023-10-09 PROCEDURE — 99024 POSTOP FOLLOW-UP VISIT: CPT

## 2023-10-09 ASSESSMENT — VISUAL ACUITY
OD_SC: 20/40-1
OS_PH: 20/40
OS_SC: 20/50
OD_PH: 20/40

## 2023-10-09 ASSESSMENT — TONOMETRY
OS_IOP_MMHG: 14
OD_IOP_MMHG: 15

## 2023-10-09 ASSESSMENT — KERATOMETRY
OD_AXISANGLE2_DEGREES: 14
OS_K1POWER_DIOPTERS: 44.50
OD_AXISANGLE_DEGREES: 104
OS_K2POWER_DIOPTERS: 45.75
OS_AXISANGLE2_DEGREES: 163
OD_K1POWER_DIOPTERS: 44.50
OS_AXISANGLE_DEGREES: 73
OD_K2POWER_DIOPTERS: 45.25

## 2024-02-08 ENCOUNTER — 3 MONTH EXAM (OUTPATIENT)
Dept: URBAN - METROPOLITAN AREA CLINIC 6 | Facility: CLINIC | Age: 74
End: 2024-02-08

## 2024-02-08 DIAGNOSIS — H35.371: ICD-10-CM

## 2024-02-08 DIAGNOSIS — H35.413: ICD-10-CM

## 2024-02-08 DIAGNOSIS — H43.813: ICD-10-CM

## 2024-02-08 DIAGNOSIS — Z96.1: ICD-10-CM

## 2024-02-08 DIAGNOSIS — E11.9: ICD-10-CM

## 2024-02-08 PROCEDURE — 92014 COMPRE OPH EXAM EST PT 1/>: CPT

## 2024-02-08 ASSESSMENT — KERATOMETRY
OS_K1POWER_DIOPTERS: 44.50
OS_AXISANGLE2_DEGREES: 163
OD_K1POWER_DIOPTERS: 44.50
OD_AXISANGLE_DEGREES: 104
OD_AXISANGLE2_DEGREES: 14
OS_K2POWER_DIOPTERS: 45.75
OS_AXISANGLE_DEGREES: 73
OD_K2POWER_DIOPTERS: 45.25

## 2024-02-08 ASSESSMENT — TONOMETRY
OD_IOP_MMHG: 20
OS_IOP_MMHG: 15

## 2024-02-08 ASSESSMENT — VISUAL ACUITY
OS_PH: 20/30+1
OD_SC: 20/30-1
OS_SC: 20/70

## 2024-06-17 ENCOUNTER — OFFICE VISIT (OUTPATIENT)
Dept: SLEEP CENTER | Facility: CLINIC | Age: 74
End: 2024-06-17
Payer: COMMERCIAL

## 2024-06-17 VITALS
DIASTOLIC BLOOD PRESSURE: 78 MMHG | BODY MASS INDEX: 45.26 KG/M2 | HEIGHT: 56 IN | SYSTOLIC BLOOD PRESSURE: 124 MMHG | WEIGHT: 201.2 LBS

## 2024-06-17 DIAGNOSIS — M54.12 CERVICAL RADICULOPATHY: ICD-10-CM

## 2024-06-17 DIAGNOSIS — F32.A DEPRESSION, UNSPECIFIED DEPRESSION TYPE: ICD-10-CM

## 2024-06-17 DIAGNOSIS — I10 HYPERTENSION, UNSPECIFIED TYPE: ICD-10-CM

## 2024-06-17 DIAGNOSIS — E66.9 OBESITY (BMI 30-39.9): ICD-10-CM

## 2024-06-17 DIAGNOSIS — R68.2 DRY MOUTH: ICD-10-CM

## 2024-06-17 DIAGNOSIS — G47.33 OSA (OBSTRUCTIVE SLEEP APNEA): Primary | ICD-10-CM

## 2024-06-17 PROCEDURE — 99214 OFFICE O/P EST MOD 30 MIN: CPT | Performed by: INTERNAL MEDICINE

## 2024-06-17 RX ORDER — TRIAMCINOLONE ACETONIDE 1 MG/G
CREAM TOPICAL
COMMUNITY
Start: 2024-05-09

## 2024-06-17 RX ORDER — LEVOTHYROXINE SODIUM 88 MCG
88 TABLET ORAL DAILY
COMMUNITY
Start: 2024-05-27

## 2024-06-17 RX ORDER — ACETAMINOPHEN 160 MG
TABLET,DISINTEGRATING ORAL
COMMUNITY
End: 2024-06-17 | Stop reason: SDUPTHER

## 2024-06-17 NOTE — PROGRESS NOTES
Follow-Up Note - Sleep Center   Kumar Hammer  74 y.o. female  :1950  MRN:[id number]  DOS:2024    CC: I saw this patient for follow-up in clinic today for Sleep disordered breathing, Coexisting Sleep and Medical Problems.  Patient received ot a refurbished dream Station Version 1 machine from [employer] over a year ago.. Interval changes: He is reporting some malfunction with the switches    Titration study in  demonstrated she needed a pressure of 24/20 cm H2O to remediate sleep disordered breathing.  BiPAP auto was initiated.  There were no results of prior diagnostic studies.    PFSH, Problem List, Medications & Allergies were reviewed in EMR.   She  has a past medical history of Depression, History of back surgery, Hyperlipidemia, Hyperthyroidism, and Neuropathy.    She has a current medication list which includes the following prescription(s): atorvastatin, vitamin d3, cyanocobalamin, diltiazem hcl coated beads, pregabalin, synthroid, triamcinolone, and desvenlafaxine.    PHYSIOLOGICAL DATA REVIEW : Using PAP > 4 hours/night 100%. Estimated BRODY 7.4/hour with pressure of 16/12cm H2O ; INTERPRETATION: Compliance is excellent; Pressure setting is:in acceptable range; ;   SUBJECTIVE: With respect to use of PAP, Kumar  is experiencing some adverse effects:dry mouth/throat, dry nose, and mask leaks .She derives benefit.  Is satisfied with sleep and daytime function.   Sleep Routine: Kumar reports getting 8 hrs sleep; she has no difficulty initiating or maintaining sleep . She arises needing someone to arouse and rarely feels refreshed .Kumar denies daytime sleepiness, or napping.  She rated herself at Total score: 1 /24 on the [place] Sleepiness Scale.   Other issues: None reported.     Habits: Reports that she has quit smoking. She has quit using smokeless tobacco.,  Reports no history of alcohol use.,  Reports no history of drug use., Caffeine use:limited; Exercise routine: regular.      ROS:  "Significant for weight fluctuates in the range of a few pounds.  She is reporting no nasal, respiratory or cardiac symptoms.  She is on Lyrica for cervical radiculopathy and Pristiq for depression.    EXAM: /78   Ht 4' 8\" (1.422 m)   Wt 91.3 kg (201 lb 3.2 oz)   BMI 45.11 kg/m²     Wt Readings from Last 3 Encounters:   06/17/24 91.3 kg (201 lb 3.2 oz)   06/07/23 99.8 kg (220 lb)   06/07/22 97.3 kg (214 lb 9.6 oz)      Patient is well groomed; well appearing.   CNS: Alert, orientated, speech clear & coherent  Psych: cooperative and in no distress. Mental state: Constricted affect.  H&N: EOMI; NC/AT: No facial pressure marks, no rashes.    Skin/Extrem: col & hydration normal; no edema  Resp: Respiratory effort is normal  Physical findings otherwise essentially unchanged from previous.    IMPRESSION: Problem List Items & Comorbidities Addressed this Visit    1. BERTHA (obstructive sleep apnea)  PAP DME Resupply/Reorder    Bipap DME      2. Dry mouth        3. Hypertension, unspecified type        4. Depression, unspecified depression type        5. Cervical radiculopathy        6. Obesity (BMI 30-39.9)            PLAN:  I reviewed results of prior studies and physiologic data with the patient.   I discussed treatment options with risks and benefits.  Treatment with  PAP is medically necessary and Kumar is agreable to continue use.   Care of equipment, methods to improve comfort using PAP and importance of compliance with therapy were discussed.  Pressure setting: continue 16/12 cmH2O and I advised addressing mask leaks.  She states this is improved since she is no longer sleeping in the lateral position.  The patient's current unit has now reached its 5 yr reasonable, useful life and needs to be replaced.  Rx provided to replace supplies and Care coordinated with DME provider.   Consideration to be given to duloxetine in place of Pristiq and Lyrica.  She will discuss with her PCP.  Discussed strategies for weight " "reduction.    Follow-up is advised in 1 year or sooner if needed to monitor progress, compliance and to adjust therapy.     Thank you for allowing me to participate in the care of this patient.    Sincerely,     Authenticated electronically on 06/17/24   Board Certified Specialist     Portions of the record may have been created with voice recognition software. Occasional wrong word or \"sound a like\" substitutions may have occurred due to the inherent limitations of voice recognition software. There may also be notations and random deletions of words or characters from malfunctioning software. Read the chart carefully and recognize, using context, where substitutions/deletions have occurred.    "

## 2024-06-19 ENCOUNTER — TELEPHONE (OUTPATIENT)
Dept: SLEEP CENTER | Facility: CLINIC | Age: 74
End: 2024-06-19

## 2024-06-20 ENCOUNTER — TELEPHONE (OUTPATIENT)
Dept: SLEEP CENTER | Facility: CLINIC | Age: 74
End: 2024-06-20

## 2024-06-21 LAB

## 2024-06-26 LAB

## 2024-07-17 LAB
DME PARACHUTE DELIVERY DATE ACTUAL: NORMAL
DME PARACHUTE DELIVERY DATE EXPECTED: NORMAL
DME PARACHUTE DELIVERY DATE REQUESTED: NORMAL
DME PARACHUTE ITEM DESCRIPTION: NORMAL
DME PARACHUTE ORDER STATUS: NORMAL
DME PARACHUTE SUPPLIER NAME: NORMAL
DME PARACHUTE SUPPLIER PHONE: NORMAL

## 2024-10-31 ENCOUNTER — TELEPHONE (OUTPATIENT)
Age: 74
End: 2024-10-31

## 2024-10-31 NOTE — TELEPHONE ENCOUNTER
Galina from PCP office called and states that pt has upcoming appt w/ neurology. Advised Galina that pt does not have appt w/ neurology. Pt has upcoming appt w/ [facility] sleep medicine, contact # [phone]. She verbalized understanding

## 2024-11-11 ENCOUNTER — TELEPHONE (OUTPATIENT)
Dept: SLEEP CENTER | Facility: CLINIC | Age: 74
End: 2024-11-11

## 2024-11-11 NOTE — TELEPHONE ENCOUNTER
Return call to patient who called on 11/8/2024 stating she had to switch doctors due to her insurance not accepted by Dr. Herron anymore.  States she will be seeing a physician at [facility] ([facility]).  Wanted to know about her records of information, informed anything that is in EPIC should be visible to her new physician, but if they need something they can call us for the information.  Patient did not recall name of new physician.

## 2025-02-21 ENCOUNTER — ESTABLISHED COMPREHENSIVE EXAM (OUTPATIENT)
Dept: URBAN - METROPOLITAN AREA CLINIC 6 | Facility: CLINIC | Age: 75
End: 2025-02-21

## 2025-02-21 DIAGNOSIS — H04.123: ICD-10-CM

## 2025-02-21 DIAGNOSIS — H35.371: ICD-10-CM

## 2025-02-21 DIAGNOSIS — H43.813: ICD-10-CM

## 2025-02-21 DIAGNOSIS — Z96.1: ICD-10-CM

## 2025-02-21 DIAGNOSIS — E11.9: ICD-10-CM

## 2025-02-21 DIAGNOSIS — H35.413: ICD-10-CM

## 2025-02-21 PROCEDURE — 92134 CPTRZ OPH DX IMG PST SGM RTA: CPT

## 2025-02-21 PROCEDURE — 92014 COMPRE OPH EXAM EST PT 1/>: CPT

## 2025-02-21 ASSESSMENT — KERATOMETRY
OD_K2POWER_DIOPTERS: 45.25
OD_AXISANGLE_DEGREES: 104
OD_K1POWER_DIOPTERS: 44.50
OS_K1POWER_DIOPTERS: 44.50
OD_AXISANGLE2_DEGREES: 14
OS_AXISANGLE_DEGREES: 73
OS_K2POWER_DIOPTERS: 45.75
OS_AXISANGLE2_DEGREES: 163

## 2025-02-21 ASSESSMENT — VISUAL ACUITY
OS_SC: 20/60-2
OD_SC: 20/50-2

## 2025-02-21 ASSESSMENT — TONOMETRY
OS_IOP_MMHG: 14
OD_IOP_MMHG: 18